# Patient Record
Sex: FEMALE | Race: WHITE | NOT HISPANIC OR LATINO | Employment: FULL TIME | URBAN - METROPOLITAN AREA
[De-identification: names, ages, dates, MRNs, and addresses within clinical notes are randomized per-mention and may not be internally consistent; named-entity substitution may affect disease eponyms.]

---
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
## 2021-08-27 ENCOUNTER — TELEPHONE (OUTPATIENT)
Dept: FAMILY MEDICINE CLINIC | Facility: CLINIC | Age: 21
End: 2021-08-27

## 2021-08-27 NOTE — TELEPHONE ENCOUNTER
Tried to call the pts mother back who called but she did not have a vm set up so I called pt and left message that she needs to call medicaid and get her provider changed before we can schedule. Her mother did call back shortly later and I told her the same thing.

## 2021-08-27 NOTE — TELEPHONE ENCOUNTER
[facility] staff attempted to follow warm transfer process and was unsuccessful     Caller: FUAD MEYERS    Relationship to patient: Mother    Best call back number: [phone]    Patient is needing: [facility] WAS ATTEMPTING TO TRANSFER THE PATIENT'S MOTHER WHEN THE CALL WAS DISCONNECTED. THE MOTHER CALLED TO RESCHEDULE HER DAUGHTER'S NEW PATIENT APPOINTMENT WITH A FEMALE PROVIDER IN THE OFFICE. SHE WILL NEED TO BE SEEN SOON, BUT THE HUB COULD NOT FIND AVAILABILITY DURING THE REQUESTED TIME FRAME.

## 2021-12-21 ENCOUNTER — OFFICE VISIT (OUTPATIENT)
Dept: FAMILY MEDICINE CLINIC | Facility: CLINIC | Age: 21
End: 2021-12-21

## 2021-12-21 ENCOUNTER — TELEPHONE (OUTPATIENT)
Dept: FAMILY MEDICINE CLINIC | Facility: CLINIC | Age: 21
End: 2021-12-21

## 2021-12-21 VITALS
OXYGEN SATURATION: 97 % | WEIGHT: 293 LBS | RESPIRATION RATE: 16 BRPM | DIASTOLIC BLOOD PRESSURE: 75 MMHG | HEIGHT: 68 IN | HEART RATE: 79 BPM | TEMPERATURE: 98 F | SYSTOLIC BLOOD PRESSURE: 116 MMHG | BODY MASS INDEX: 44.41 KG/M2

## 2021-12-21 DIAGNOSIS — Z78.9 FEMALE-TO-MALE TRANSGENDER PERSON: Primary | ICD-10-CM

## 2021-12-21 DIAGNOSIS — Z13.220 SCREENING CHOLESTEROL LEVEL: ICD-10-CM

## 2021-12-21 DIAGNOSIS — F43.10 PTSD (POST-TRAUMATIC STRESS DISORDER): ICD-10-CM

## 2021-12-21 DIAGNOSIS — M25.50 MULTIPLE JOINT PAIN: ICD-10-CM

## 2021-12-21 DIAGNOSIS — R07.89 CHEST TIGHTNESS: ICD-10-CM

## 2021-12-21 DIAGNOSIS — R41.3 MEMORY DEFICIT: ICD-10-CM

## 2021-12-21 DIAGNOSIS — R41.840 ATTENTION AND CONCENTRATION DEFICIT: ICD-10-CM

## 2021-12-21 DIAGNOSIS — F41.9 ANXIETY: ICD-10-CM

## 2021-12-21 DIAGNOSIS — Z13.1 SCREENING FOR DIABETES MELLITUS: ICD-10-CM

## 2021-12-21 DIAGNOSIS — N92.6 IRREGULAR PERIODS: ICD-10-CM

## 2021-12-21 PROCEDURE — 99214 OFFICE O/P EST MOD 30 MIN: CPT | Performed by: PHYSICIAN ASSISTANT

## 2021-12-21 RX ORDER — SUMATRIPTAN 25 MG/1
TABLET, FILM COATED ORAL
COMMUNITY
Start: 2016-09-28

## 2021-12-21 RX ORDER — ONDANSETRON 4 MG/1
TABLET, FILM COATED ORAL EVERY 6 HOURS
COMMUNITY
Start: 2019-01-22

## 2021-12-21 RX ORDER — CETIRIZINE HYDROCHLORIDE 10 MG/1
TABLET ORAL
COMMUNITY
Start: 2016-09-28

## 2021-12-21 RX ORDER — LORATADINE 10 MG/1
TABLET ORAL
COMMUNITY
Start: 2016-09-28

## 2021-12-21 RX ORDER — TOPIRAMATE 25 MG/1
TABLET ORAL
COMMUNITY
Start: 2016-09-28

## 2021-12-21 RX ORDER — FLUTICASONE PROPIONATE 50 MCG
SPRAY, SUSPENSION (ML) NASAL
COMMUNITY
Start: 2018-11-05 | End: 2021-12-21

## 2021-12-21 NOTE — TELEPHONE ENCOUNTER
Can you please check with [facility] and get any imaging results done on pt's left knee and left ankle in the past?

## 2021-12-21 NOTE — TELEPHONE ENCOUNTER
PATIENT CALLED AND STATES SHE WAS JUST SEEN AND FORGOT TO GET A LETTER OF ACCOMMODATION.   STATES THE LETTER NEEDS A START DATE, REASON, AND NEEDS TO SAY PERMANETLY FOR THE REASON OR IT WILL NOT BE ACCEPTED. SHE WILL BE BACK IN THE MORNING FOR LABS AND WILL  LETTER AT THE SAME TIME.    CALL BACK NUMBER [phone]

## 2021-12-21 NOTE — PROGRESS NOTES
Subjective   Chelsea Griffin is a 21 y.o. female.     Pt is a new patient who presents for routine annual physical.  He is concerned about possible asthma.  He does have family hx of asthma.  He sometimes gets tight feeling in chest and that sometimes lasts a minute or up to 10 minutes. He did try to use an albuterol inhaler which helped a little.  Not associated with anxiety or stress that he is aware.  Also feeling more forgetful and not sure if due to possible ADHD/autism.  He does see therapist once every 2 weeks and has been seeing them for over a year.  He does have hx of anxiety and PTSD. Her brother is high functioning autistic. Would be interested in getting tested for autism.  Has issues also getting to sleep and staying asleep also.    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  Would like accomodation for work so that he doesn't have to go up and down the stairs.  He also has overactive bladder and needs to be able to have frequent bathroom breaks as needed (bad day at least 1 time per hour).  He also would like to have headphone accomodation to be able to have one ear bud in to help him stay focused on work and not on him thoughts.    His mom also has Melanoma and is going through treatment which is adding to stress/anxiety.   Also had injury to left ankle years ago and it continues to give out on him. Xray was normal.  Also had injury to left knee years ago and landed on knee.  Xrays normal but continues to bother him.    Elbow and wrist on left bother him and other joints also.        The following portions of the patient's history were reviewed and updated as appropriate: allergies, current medications, past family history, past medical history, past social history, past surgical history and problem list.  Past Medical History:   Diagnosis Date   • Anxiety    • Heart murmur    • PTSD  (post-traumatic stress disorder)      Past Surgical History:   Procedure Laterality Date   • ADENOIDECTOMY     • TONSILLECTOMY       Family History   Problem Relation Age of Onset   • Anxiety disorder Mother    • Arthritis Mother    • Asthma Mother    • Cancer Mother    • Depression Mother    • Developmental Disability Mother    • Diabetes Mother    • Hearing loss Mother    • Hyperlipidemia Mother    • Hypertension Mother    • Mental illness Mother    • Stroke Mother    • COPD Father    • Hyperlipidemia Father    • Hypertension Father    • Depression Father    • Diabetes Maternal Grandmother    • Cancer Maternal Grandmother    • Hypertension Maternal Grandmother    • Hyperlipidemia Maternal Grandmother    • COPD Maternal Grandfather    • Asthma Maternal Grandfather    • Cancer Maternal Grandfather    • Diabetes Paternal Grandmother    • Cancer Paternal Grandmother    • Hypertension Paternal Grandmother    • Hyperlipidemia Paternal Grandmother    • COPD Paternal Grandfather    • Heart disease Paternal Grandfather      Social History     Socioeconomic History   • Marital status: Single   Tobacco Use   • Smoking status: Never Smoker   • Smokeless tobacco: Never Used   Vaping Use   • Vaping Use: Never used   Substance and Sexual Activity   • Drug use: Never   • Sexual activity: Defer         Current Outpatient Medications:   •  cetirizine (ZyrTEC Allergy) 10 MG tablet, ZYRTEC ALLERGY 10 MG TABS, Disp: , Rfl:   •  loratadine (Claritin) 10 MG tablet, CLARITIN 10 MG TABS, Disp: , Rfl:   •  ondansetron (Zofran) 4 MG tablet, Every 6 (Six) Hours., Disp: , Rfl:   •  SUMAtriptan (Imitrex) 25 MG tablet, IMITREX 25 MG TABS, Disp: , Rfl:   •  topiramate (Topamax) 25 MG tablet, TOPAMAX 25 MG TABS, Disp: , Rfl:     Review of Systems   Constitutional: Positive for fatigue. Negative for activity change, appetite change, chills, diaphoresis, fever, unexpected weight gain and unexpected weight loss.   HENT: Positive for tinnitus.   "  Eyes: Negative for visual disturbance.   Respiratory: Positive for chest tightness and shortness of breath. Negative for wheezing.    Cardiovascular: Negative for chest pain, palpitations and leg swelling.   Gastrointestinal: Negative for abdominal pain, anal bleeding, blood in stool, constipation, diarrhea and nausea.   Genitourinary: Positive for menstrual problem. Negative for pelvic pain, pelvic pressure and vaginal discharge.   Musculoskeletal: Positive for arthralgias. Negative for back pain and gait problem.   Neurological: Positive for memory problem. Negative for dizziness, tremors, seizures, syncope, weakness, light-headedness, headache and confusion.   Psychiatric/Behavioral: Positive for decreased concentration, sleep disturbance, depressed mood and stress. Negative for agitation, self-injury and suicidal ideas. The patient is nervous/anxious.      /75 (BP Location: Right arm, Patient Position: Sitting, Cuff Size: Large Adult)   Pulse 79   Temp 98 °F (36.7 °C) (Temporal)   Resp 16   Ht 172 cm (67.72\")   Wt (!) 139 kg (306 lb)   SpO2 97%   BMI 46.92 kg/m²       Objective   Physical Exam  Vitals and nursing note reviewed.   Constitutional:       Appearance: Normal appearance. She is obese.   HENT:      Head: Normocephalic and atraumatic.      Right Ear: Tympanic membrane, ear canal and external ear normal.      Left Ear: Tympanic membrane, ear canal and external ear normal.   Eyes:      Pupils: Pupils are equal, round, and reactive to light.   Neck:      Thyroid: No thyroid mass, thyromegaly or thyroid tenderness.      Vascular: No carotid bruit.   Cardiovascular:      Rate and Rhythm: Normal rate and regular rhythm.      Heart sounds: Normal heart sounds.   Pulmonary:      Effort: Pulmonary effort is normal.      Breath sounds: Normal breath sounds.   Musculoskeletal:      Cervical back: Normal range of motion and neck supple.      Right lower leg: No edema.      Left lower leg: No edema. "   Skin:     General: Skin is warm and dry.   Neurological:      General: No focal deficit present.      Mental Status: She is alert and oriented to person, place, and time.   Psychiatric:         Mood and Affect: Mood normal.         Behavior: Behavior normal.         Thought Content: Thought content normal.         Procedures     Assessment/Plan   Diagnoses and all orders for this visit:    1. 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  Orders:  -     Ambulatory Referral to Endocrinology    2. Chest tightness  Comments:  No current chest tightness but comes and goes with shortness of breath.  No associated anxiety and ocurrs at rest and with with activity. Will check PFTs since   Orders:  -     Full Pulmonary Function Test With Bronchodilator; Future    3. Multiple joint pain  Comments:  Will check labs and will get imaging results from left ankle and left knee done at [facility] in the past.    4. Anxiety  Comments:  Continue seeing therapist.  Will also send to neuropsych for further evaluation for possible ADHD and autism.  Orders:  -     Ambulatory Referral to Neuropsychology    5. PTSD (post-traumatic stress disorder)  Comments:  Continue therapy  Orders:  -     Ambulatory Referral to Neuropsychology    6. Attention and concentration deficit  Comments:  Likely ADHD or secondary to anxiety not being under control.   Orders:  -     Ambulatory Referral to Neuropsychology    7. Memory deficit  Comments:  Will send for neuropsych testing. Likely secondary to her anxiety and/or ADHD.  Orders:  -     Ambulatory Referral to Neuropsychology    8. Screening cholesterol level  Comments:  Pt to get fasting labs done and follow up in 2 weeks.  Orders:  -     Lipid Panel; Future    9. Screening for diabetes mellitus  Comments:  Pt to get fasting labs done and follow up in 2 weeks.  Orders:  -     Comprehensive Metabolic Panel; Future    10. Irregular periods  Comments:  Possibly  PCOS.  Will check labs and follow up in 2 weeks.  Orders:  -     FSH & LH; Future  -     CBC auto differential; Future  -     DHEA-sulfate; Future  -     Ferritin; Future  -     Follicle stimulating hormone; Future  -     Luteinizing hormone; Future  -     Prolactin; Future  -     Testosterone Free Direct; Future  -     TSH; Future  -     Insulin, Total; Future      I spent 30 minutes of patient care including reviewing pt's previous hx, reviewing current symptoms, performing exam, ordering tests, discussing treatment plan and completing my note.

## 2021-12-22 ENCOUNTER — LAB (OUTPATIENT)
Dept: FAMILY MEDICINE CLINIC | Facility: CLINIC | Age: 21
End: 2021-12-22

## 2021-12-22 DIAGNOSIS — N92.6 IRREGULAR PERIODS: ICD-10-CM

## 2021-12-22 DIAGNOSIS — Z13.220 SCREENING CHOLESTEROL LEVEL: ICD-10-CM

## 2021-12-22 DIAGNOSIS — Z13.1 SCREENING FOR DIABETES MELLITUS: ICD-10-CM

## 2021-12-22 LAB
ALBUMIN SERPL-MCNC: 4.3 G/DL (ref 3.5–5.2)
ALBUMIN/GLOB SERPL: 1.4 G/DL
ALP SERPL-CCNC: 74 U/L (ref 39–117)
ALT SERPL W P-5'-P-CCNC: 16 U/L (ref 1–33)
ANION GAP SERPL CALCULATED.3IONS-SCNC: 10 MMOL/L (ref 5–15)
AST SERPL-CCNC: 18 U/L (ref 1–32)
BASOPHILS # BLD AUTO: 0.04 10*3/MM3 (ref 0–0.2)
BASOPHILS NFR BLD AUTO: 0.5 % (ref 0–1.5)
BILIRUB SERPL-MCNC: 0.5 MG/DL (ref 0–1.2)
BUN SERPL-MCNC: 16 MG/DL (ref 6–20)
BUN/CREAT SERPL: 22.5 (ref 7–25)
CALCIUM SPEC-SCNC: 9.2 MG/DL (ref 8.6–10.5)
CHLORIDE SERPL-SCNC: 100 MMOL/L (ref 98–107)
CHOLEST SERPL-MCNC: 171 MG/DL (ref 0–200)
CO2 SERPL-SCNC: 27 MMOL/L (ref 22–29)
CREAT SERPL-MCNC: 0.71 MG/DL (ref 0.57–1)
DEPRECATED RDW RBC AUTO: 38.9 FL (ref 37–54)
EOSINOPHIL # BLD AUTO: 0.09 10*3/MM3 (ref 0–0.4)
EOSINOPHIL NFR BLD AUTO: 1.1 % (ref 0.3–6.2)
ERYTHROCYTE [DISTWIDTH] IN BLOOD BY AUTOMATED COUNT: 12.6 % (ref 12.3–15.4)
FERRITIN SERPL-MCNC: 80.9 NG/ML (ref 13–150)
FSH SERPL-ACNC: 3.23 MIU/ML
FSH SERPL-ACNC: 3.23 MIU/ML
GFR SERPL CREATININE-BSD FRML MDRD: 104 ML/MIN/1.73
GLOBULIN UR ELPH-MCNC: 3 GM/DL
GLUCOSE SERPL-MCNC: 94 MG/DL (ref 65–99)
HCT VFR BLD AUTO: 36.5 % (ref 34–46.6)
HDLC SERPL-MCNC: 38 MG/DL (ref 40–60)
HGB BLD-MCNC: 11.8 G/DL (ref 12–15.9)
IMM GRANULOCYTES # BLD AUTO: 0.02 10*3/MM3 (ref 0–0.05)
IMM GRANULOCYTES NFR BLD AUTO: 0.2 % (ref 0–0.5)
LDLC SERPL CALC-MCNC: 114 MG/DL (ref 0–100)
LDLC/HDLC SERPL: 2.96 {RATIO}
LH SERPL-ACNC: 2.45 MIU/ML
LH SERPL-ACNC: 2.45 MIU/ML
LYMPHOCYTES # BLD AUTO: 3.45 10*3/MM3 (ref 0.7–3.1)
LYMPHOCYTES NFR BLD AUTO: 40.5 % (ref 19.6–45.3)
MCH RBC QN AUTO: 27.4 PG (ref 26.6–33)
MCHC RBC AUTO-ENTMCNC: 32.3 G/DL (ref 31.5–35.7)
MCV RBC AUTO: 84.7 FL (ref 79–97)
MONOCYTES # BLD AUTO: 0.55 10*3/MM3 (ref 0.1–0.9)
MONOCYTES NFR BLD AUTO: 6.5 % (ref 5–12)
NEUTROPHILS NFR BLD AUTO: 4.36 10*3/MM3 (ref 1.7–7)
NEUTROPHILS NFR BLD AUTO: 51.2 % (ref 42.7–76)
NRBC BLD AUTO-RTO: 0 /100 WBC (ref 0–0.2)
PLATELET # BLD AUTO: 298 10*3/MM3 (ref 140–450)
PMV BLD AUTO: 11 FL (ref 6–12)
POTASSIUM SERPL-SCNC: 4.3 MMOL/L (ref 3.5–5.2)
PROLACTIN SERPL-MCNC: 7.08 NG/ML (ref 4.79–23.3)
PROT SERPL-MCNC: 7.3 G/DL (ref 6–8.5)
RBC # BLD AUTO: 4.31 10*6/MM3 (ref 3.77–5.28)
SODIUM SERPL-SCNC: 137 MMOL/L (ref 136–145)
TRIGL SERPL-MCNC: 102 MG/DL (ref 0–150)
TSH SERPL DL<=0.05 MIU/L-ACNC: 1.65 UIU/ML (ref 0.27–4.2)
VLDLC SERPL-MCNC: 19 MG/DL (ref 5–40)
WBC NRBC COR # BLD: 8.51 10*3/MM3 (ref 3.4–10.8)

## 2021-12-22 PROCEDURE — 84402 ASSAY OF FREE TESTOSTERONE: CPT | Performed by: PHYSICIAN ASSISTANT

## 2021-12-22 PROCEDURE — 82627 DEHYDROEPIANDROSTERONE: CPT | Performed by: PHYSICIAN ASSISTANT

## 2021-12-22 PROCEDURE — 80053 COMPREHEN METABOLIC PANEL: CPT | Performed by: PHYSICIAN ASSISTANT

## 2021-12-22 PROCEDURE — 36415 COLL VENOUS BLD VENIPUNCTURE: CPT

## 2021-12-22 PROCEDURE — 84146 ASSAY OF PROLACTIN: CPT | Performed by: PHYSICIAN ASSISTANT

## 2021-12-22 PROCEDURE — 82728 ASSAY OF FERRITIN: CPT | Performed by: PHYSICIAN ASSISTANT

## 2021-12-22 PROCEDURE — 84443 ASSAY THYROID STIM HORMONE: CPT | Performed by: PHYSICIAN ASSISTANT

## 2021-12-22 PROCEDURE — 83001 ASSAY OF GONADOTROPIN (FSH): CPT | Performed by: PHYSICIAN ASSISTANT

## 2021-12-22 PROCEDURE — 85025 COMPLETE CBC W/AUTO DIFF WBC: CPT | Performed by: PHYSICIAN ASSISTANT

## 2021-12-22 PROCEDURE — 83525 ASSAY OF INSULIN: CPT | Performed by: PHYSICIAN ASSISTANT

## 2021-12-22 PROCEDURE — 80061 LIPID PANEL: CPT | Performed by: PHYSICIAN ASSISTANT

## 2021-12-22 PROCEDURE — 83002 ASSAY OF GONADOTROPIN (LH): CPT | Performed by: PHYSICIAN ASSISTANT

## 2021-12-23 DIAGNOSIS — F41.9 ANXIETY: ICD-10-CM

## 2021-12-23 DIAGNOSIS — R41.840 ATTENTION AND CONCENTRATION DEFICIT: ICD-10-CM

## 2021-12-23 DIAGNOSIS — F43.10 PTSD (POST-TRAUMATIC STRESS DISORDER): Primary | ICD-10-CM

## 2021-12-23 LAB
DHEA-S SERPL-MCNC: 132 UG/DL (ref 110–431.7)
INSULIN SERPL-ACNC: 21.8 UIU/ML (ref 2.6–24.9)

## 2021-12-24 LAB — TESTOST FREE SERPL-MCNC: 1.4 PG/ML (ref 0–4.2)

## 2021-12-29 ENCOUNTER — TRANSCRIBE ORDERS (OUTPATIENT)
Dept: ADMINISTRATIVE | Facility: HOSPITAL | Age: 21
End: 2021-12-29

## 2021-12-29 DIAGNOSIS — Z01.818 OTHER SPECIFIED PRE-OPERATIVE EXAMINATION: Primary | ICD-10-CM

## 2022-01-18 ENCOUNTER — OFFICE VISIT (OUTPATIENT)
Dept: FAMILY MEDICINE CLINIC | Facility: CLINIC | Age: 22
End: 2022-01-18

## 2022-01-18 VITALS
SYSTOLIC BLOOD PRESSURE: 122 MMHG | WEIGHT: 293 LBS | TEMPERATURE: 96.6 F | OXYGEN SATURATION: 100 % | DIASTOLIC BLOOD PRESSURE: 76 MMHG | BODY MASS INDEX: 46.15 KG/M2 | HEART RATE: 62 BPM

## 2022-01-18 DIAGNOSIS — F41.9 ANXIETY: ICD-10-CM

## 2022-01-18 DIAGNOSIS — Z78.9 FEMALE-TO-MALE TRANSGENDER PERSON: ICD-10-CM

## 2022-01-18 DIAGNOSIS — G89.29 CHRONIC PAIN OF LEFT KNEE: ICD-10-CM

## 2022-01-18 DIAGNOSIS — M25.562 CHRONIC PAIN OF LEFT KNEE: ICD-10-CM

## 2022-01-18 DIAGNOSIS — E78.00 HYPERCHOLESTEROLEMIA: Primary | ICD-10-CM

## 2022-01-18 DIAGNOSIS — R07.89 CHEST TIGHTNESS: ICD-10-CM

## 2022-01-18 PROCEDURE — 99213 OFFICE O/P EST LOW 20 MIN: CPT | Performed by: PHYSICIAN ASSISTANT

## 2022-01-18 NOTE — PROGRESS NOTES
Subjective   Chelsea Griffin is a 21 y.o. female.     Pt presents to follow up on labs. Labs were normal other than mildly elevated cholesterol. He left knee pain is not bothering him currently but has in the past.  Has bothered him intermittently since last February when he feel on his knee.  He gets intermittent pain in knee cap.  Doesn't want to do PT at this time. He is doing well with therapy.  He hasn't heard about referral for transgender specialist so information given today and pt encouraged to contact them.  Pt was also given [facility]'s contact information since he hasn't heard from them.  He states he is scheduled to have breathing tests soon.        The following portions of the patient's history were reviewed and updated as appropriate: allergies, current medications, past family history, past medical history, past social history, past surgical history and problem list.  Past Medical History:   Diagnosis Date   • Anxiety    • Heart murmur    • PTSD (post-traumatic stress disorder)      Past Surgical History:   Procedure Laterality Date   • ADENOIDECTOMY     • TONSILLECTOMY       Family History   Problem Relation Age of Onset   • Anxiety disorder Mother    • Arthritis Mother    • Asthma Mother    • Cancer Mother    • Depression Mother    • Developmental Disability Mother    • Diabetes Mother    • Hearing loss Mother    • Hyperlipidemia Mother    • Hypertension Mother    • Mental illness Mother    • Stroke Mother    • COPD Father    • Hyperlipidemia Father    • Hypertension Father    • Depression Father    • Diabetes Maternal Grandmother    • Cancer Maternal Grandmother    • Hypertension Maternal Grandmother    • Hyperlipidemia Maternal Grandmother    • COPD Maternal Grandfather    • Asthma Maternal Grandfather    • Cancer Maternal Grandfather    • Diabetes Paternal Grandmother    • Cancer Paternal Grandmother    • Hypertension Paternal Grandmother    • Hyperlipidemia Paternal Grandmother    • COPD  Paternal Grandfather    • Heart disease Paternal Grandfather      Social History     Socioeconomic History   • Marital status: Single   Tobacco Use   • Smoking status: Never Smoker   • Smokeless tobacco: Never Used   Vaping Use   • Vaping Use: Never used   Substance and Sexual Activity   • Drug use: Never   • Sexual activity: Defer         Current Outpatient Medications:   •  cetirizine (ZyrTEC Allergy) 10 MG tablet, ZYRTEC ALLERGY 10 MG TABS, Disp: , Rfl:   •  loratadine (Claritin) 10 MG tablet, CLARITIN 10 MG TABS, Disp: , Rfl:   •  ondansetron (Zofran) 4 MG tablet, Every 6 (Six) Hours., Disp: , Rfl:   •  SUMAtriptan (Imitrex) 25 MG tablet, IMITREX 25 MG TABS, Disp: , Rfl:   •  topiramate (Topamax) 25 MG tablet, TOPAMAX 25 MG TABS, Disp: , Rfl:     Review of Systems   Constitutional: Negative for activity change, appetite change, chills, diaphoresis, fatigue, fever, unexpected weight gain and unexpected weight loss.   Respiratory: Negative for chest tightness and shortness of breath.    Cardiovascular: Negative for chest pain, palpitations and leg swelling.   Musculoskeletal: Positive for arthralgias.   Neurological: Negative for dizziness, tremors, weakness, light-headedness, headache and confusion.   Psychiatric/Behavioral: Positive for stress. The patient is nervous/anxious.      /76 (BP Location: Right arm, Patient Position: Sitting, Cuff Size: Large Adult)   Pulse 62   Temp 96.6 °F (35.9 °C) (Tympanic)   Wt (!) 137 kg (301 lb)   SpO2 100%   BMI 46.15 kg/m²       Objective   Physical Exam  Vitals and nursing note reviewed.   Constitutional:       Appearance: Normal appearance.   Neck:      Vascular: No carotid bruit.   Cardiovascular:      Rate and Rhythm: Normal rate and regular rhythm.      Heart sounds: Normal heart sounds.   Pulmonary:      Effort: Pulmonary effort is normal.      Breath sounds: Normal breath sounds.   Musculoskeletal:      Cervical back: Neck supple.   Neurological:       General: No focal deficit present.      Mental Status: She is alert and oriented to person, place, and time.   Psychiatric:         Mood and Affect: Mood normal.         Behavior: Behavior normal.         Thought Content: Thought content normal.         Judgment: Judgment normal.         Procedures     Assessment/Plan   Diagnoses and all orders for this visit:    1. Hypercholesterolemia (Primary)  Comments:  Discussed low saturated fat diet and high fiber diet.  Also discussed exercising at least 20 minutes per day.  Recheck annually.    2. Female-to-male transgender person  Comments:  Contact information for referral given to patient today at visit.    3. Chest tightness  Comments:  Pt to get pulmonary function tests done as scheduled.  Work on weight loss since this may be contributing.    4. Anxiety  Comments:  Doing well with therapy.  Also given contact information for [facility] today for further eval.    5. Chronic pain of left knee  Comments:  Pt states it is intermittent and manageable at this time.  If it worsens and he decides he wants to do PT, he will let me know.

## 2022-01-19 ENCOUNTER — APPOINTMENT (OUTPATIENT)
Dept: LAB | Facility: HOSPITAL | Age: 22
End: 2022-01-19

## 2022-01-21 ENCOUNTER — APPOINTMENT (OUTPATIENT)
Dept: RESPIRATORY THERAPY | Facility: HOSPITAL | Age: 22
End: 2022-01-21

## 2023-04-17 ENCOUNTER — APPOINTMENT (OUTPATIENT)
Dept: GENERAL RADIOLOGY | Facility: HOSPITAL | Age: 23
End: 2023-04-17
Payer: COMMERCIAL

## 2023-04-17 ENCOUNTER — HOSPITAL ENCOUNTER (EMERGENCY)
Facility: HOSPITAL | Age: 23
Discharge: HOME | End: 2023-04-17
Attending: EMERGENCY MEDICINE | Admitting: EMERGENCY MEDICINE
Payer: COMMERCIAL

## 2023-04-17 VITALS
DIASTOLIC BLOOD PRESSURE: 73 MMHG | HEART RATE: 80 BPM | HEIGHT: 69 IN | SYSTOLIC BLOOD PRESSURE: 128 MMHG | BODY MASS INDEX: 43.4 KG/M2 | WEIGHT: 293 LBS | OXYGEN SATURATION: 98 % | TEMPERATURE: 98 F | RESPIRATION RATE: 19 BRPM

## 2023-04-17 DIAGNOSIS — R53.1 WEAKNESS: Primary | ICD-10-CM

## 2023-04-17 LAB
ALBUMIN SERPL-MCNC: 4.3 G/DL (ref 3.5–5.2)
ALBUMIN/GLOB SERPL: 1.4 G/DL
ALP SERPL-CCNC: 85 U/L (ref 39–117)
ALT SERPL W P-5'-P-CCNC: 16 U/L (ref 1–33)
ANION GAP SERPL CALCULATED.3IONS-SCNC: 10 MMOL/L (ref 5–15)
AST SERPL-CCNC: 17 U/L (ref 1–32)
BASOPHILS # BLD AUTO: 0 10*3/MM3 (ref 0–0.2)
BASOPHILS NFR BLD AUTO: 0.4 % (ref 0–1.5)
BILIRUB SERPL-MCNC: 0.7 MG/DL (ref 0–1.2)
BILIRUB UR QL STRIP: NEGATIVE
BUN SERPL-MCNC: 12 MG/DL (ref 6–20)
BUN/CREAT SERPL: 18.5 (ref 7–25)
CALCIUM SPEC-SCNC: 8.9 MG/DL (ref 8.6–10.5)
CHLORIDE SERPL-SCNC: 100 MMOL/L (ref 98–107)
CK SERPL-CCNC: 129 U/L (ref 20–180)
CLARITY UR: CLEAR
CO2 SERPL-SCNC: 28 MMOL/L (ref 22–29)
COHGB MFR BLD: 2.1 % (ref 0–3)
COLOR UR: YELLOW
CREAT SERPL-MCNC: 0.65 MG/DL (ref 0.57–1)
DEPRECATED RDW RBC AUTO: 42.4 FL (ref 37–54)
EGFRCR SERPLBLD CKD-EPI 2021: 127.8 ML/MIN/1.73
EOSINOPHIL # BLD AUTO: 0 10*3/MM3 (ref 0–0.4)
EOSINOPHIL NFR BLD AUTO: 0.5 % (ref 0.3–6.2)
ERYTHROCYTE [DISTWIDTH] IN BLOOD BY AUTOMATED COUNT: 13.9 % (ref 12.3–15.4)
FLUAV SUBTYP SPEC NAA+PROBE: NOT DETECTED
FLUBV RNA ISLT QL NAA+PROBE: NOT DETECTED
GLOBULIN UR ELPH-MCNC: 3 GM/DL
GLUCOSE SERPL-MCNC: 93 MG/DL (ref 65–99)
GLUCOSE UR STRIP-MCNC: NEGATIVE MG/DL
HCT VFR BLD AUTO: 40.5 % (ref 34–46.6)
HGB BLD-MCNC: 13.8 G/DL (ref 12–15.9)
HGB UR QL STRIP.AUTO: NEGATIVE
KETONES UR QL STRIP: NEGATIVE
LEUKOCYTE ESTERASE UR QL STRIP.AUTO: NEGATIVE
LIPASE SERPL-CCNC: 31 U/L (ref 13–60)
LYMPHOCYTES # BLD AUTO: 0.9 10*3/MM3 (ref 0.7–3.1)
LYMPHOCYTES NFR BLD AUTO: 12.8 % (ref 19.6–45.3)
MAGNESIUM SERPL-MCNC: 1.8 MG/DL (ref 1.6–2.6)
MCH RBC QN AUTO: 28 PG (ref 26.6–33)
MCHC RBC AUTO-ENTMCNC: 34 G/DL (ref 31.5–35.7)
MCV RBC AUTO: 82.5 FL (ref 79–97)
MONOCYTES # BLD AUTO: 0.4 10*3/MM3 (ref 0.1–0.9)
MONOCYTES NFR BLD AUTO: 5.5 % (ref 5–12)
NEUTROPHILS NFR BLD AUTO: 5.6 10*3/MM3 (ref 1.7–7)
NEUTROPHILS NFR BLD AUTO: 80.8 % (ref 42.7–76)
NITRITE UR QL STRIP: NEGATIVE
NRBC BLD AUTO-RTO: 0 /100 WBC (ref 0–0.2)
PH UR STRIP.AUTO: 7.5 [PH] (ref 5–8)
PLATELET # BLD AUTO: 249 10*3/MM3 (ref 140–450)
PMV BLD AUTO: 8.5 FL (ref 6–12)
POTASSIUM SERPL-SCNC: 4.6 MMOL/L (ref 3.5–5.2)
PROT SERPL-MCNC: 7.3 G/DL (ref 6–8.5)
PROT UR QL STRIP: NEGATIVE
RBC # BLD AUTO: 4.91 10*6/MM3 (ref 3.77–5.28)
SARS-COV-2 RNA RESP QL NAA+PROBE: NOT DETECTED
SODIUM SERPL-SCNC: 138 MMOL/L (ref 136–145)
SP GR UR STRIP: 1.01 (ref 1–1.03)
TSH SERPL DL<=0.05 MIU/L-ACNC: 0.78 UIU/ML (ref 0.27–4.2)
UROBILINOGEN UR QL STRIP: NORMAL
WBC NRBC COR # BLD: 7 10*3/MM3 (ref 3.4–10.8)

## 2023-04-17 PROCEDURE — 80050 GENERAL HEALTH PANEL: CPT

## 2023-04-17 PROCEDURE — 71046 X-RAY EXAM CHEST 2 VIEWS: CPT

## 2023-04-17 PROCEDURE — 83735 ASSAY OF MAGNESIUM: CPT | Performed by: EMERGENCY MEDICINE

## 2023-04-17 PROCEDURE — 87636 SARSCOV2 & INF A&B AMP PRB: CPT | Performed by: EMERGENCY MEDICINE

## 2023-04-17 PROCEDURE — 96374 THER/PROPH/DIAG INJ IV PUSH: CPT

## 2023-04-17 PROCEDURE — 99283 EMERGENCY DEPT VISIT LOW MDM: CPT

## 2023-04-17 PROCEDURE — 81003 URINALYSIS AUTO W/O SCOPE: CPT

## 2023-04-17 PROCEDURE — 82375 ASSAY CARBOXYHB QUANT: CPT | Performed by: EMERGENCY MEDICINE

## 2023-04-17 PROCEDURE — 83690 ASSAY OF LIPASE: CPT

## 2023-04-17 PROCEDURE — 82550 ASSAY OF CK (CPK): CPT | Performed by: EMERGENCY MEDICINE

## 2023-04-17 PROCEDURE — 25010000002 ONDANSETRON PER 1 MG: Performed by: EMERGENCY MEDICINE

## 2023-04-17 RX ORDER — ONDANSETRON 2 MG/ML
4 INJECTION INTRAMUSCULAR; INTRAVENOUS ONCE
Status: COMPLETED | OUTPATIENT
Start: 2023-04-17 | End: 2023-04-17

## 2023-04-17 RX ORDER — ONDANSETRON 4 MG/1
4 TABLET, ORALLY DISINTEGRATING ORAL EVERY 6 HOURS PRN
Qty: 12 TABLET | Refills: 0 | Status: SHIPPED | OUTPATIENT
Start: 2023-04-17

## 2023-04-17 RX ORDER — SODIUM CHLORIDE 0.9 % (FLUSH) 0.9 %
10 SYRINGE (ML) INJECTION AS NEEDED
Status: DISCONTINUED | OUTPATIENT
Start: 2023-04-17 | End: 2023-04-18 | Stop reason: HOSPADM

## 2023-04-17 RX ADMIN — SODIUM CHLORIDE, POTASSIUM CHLORIDE, SODIUM LACTATE AND CALCIUM CHLORIDE 1000 ML: 600; 310; 30; 20 INJECTION, SOLUTION INTRAVENOUS at 21:18

## 2023-04-17 RX ADMIN — ONDANSETRON 4 MG: 2 INJECTION INTRAMUSCULAR; INTRAVENOUS at 21:18

## 2023-04-17 NOTE — ED PROVIDER NOTES
Subjective      Provider in Triage Note  Patient presented to the emergency room with headache, nausea vomiting mid abdominal pain.  Denies diarrhea or constipation has had no recent illness or exposure.      History of Present Illness  Chief complaint General weakness headache nausea vomiting fatigue    History of present illness 22-year-old female with 3-day history of some general fatigue weakness body aches little bit of cough congestion headache some vomiting no diarrhea.  No fever chills.  No foreign travels antibiotic use or recent hospitalization no dysuria frequency no tick bites or rashes no one in the home with similar illness.  No rashes.  No discharge or worry of STD.  Denies any injury or foreign travels.  No other complaints or associated symptoms at this time.        Review of Systems   Constitutional: Positive for fatigue. Negative for chills and fever.   HENT: Positive for congestion. Negative for sinus pressure.    Eyes: Negative for photophobia and visual disturbance.   Respiratory: Negative for cough, chest tightness and shortness of breath.    Gastrointestinal: Positive for abdominal pain and vomiting.   Genitourinary: Negative for difficulty urinating and dysuria.   Musculoskeletal: Positive for myalgias. Negative for back pain and neck pain.   Skin: Negative for rash and wound.   Neurological: Positive for headaches. Negative for dizziness, facial asymmetry and speech difficulty.   Psychiatric/Behavioral: Negative for agitation and confusion.       Past Medical History:   Diagnosis Date   • Anxiety    • Heart murmur    • PTSD (post-traumatic stress disorder)        Allergies   Allergen Reactions   • Promethazine Unknown - High Severity       Past Surgical History:   Procedure Laterality Date   • ADENOIDECTOMY     • TONSILLECTOMY         Family History   Problem Relation Age of Onset   • Anxiety disorder Mother    • Arthritis Mother    • Asthma Mother    • Cancer Mother    • Depression Mother     • Developmental Disability Mother    • Diabetes Mother    • Hearing loss Mother    • Hyperlipidemia Mother    • Hypertension Mother    • Mental illness Mother    • Stroke Mother    • COPD Father    • Hyperlipidemia Father    • Hypertension Father    • Depression Father    • Diabetes Maternal Grandmother    • Cancer Maternal Grandmother    • Hypertension Maternal Grandmother    • Hyperlipidemia Maternal Grandmother    • COPD Maternal Grandfather    • Asthma Maternal Grandfather    • Cancer Maternal Grandfather    • Diabetes Paternal Grandmother    • Cancer Paternal Grandmother    • Hypertension Paternal Grandmother    • Hyperlipidemia Paternal Grandmother    • COPD Paternal Grandfather    • Heart disease Paternal Grandfather        Social History     Socioeconomic History   • Marital status: Single   Tobacco Use   • Smoking status: Never   • Smokeless tobacco: Never   Vaping Use   • Vaping Use: Never used   Substance and Sexual Activity   • Drug use: Never   • Sexual activity: Defer     Prior to Admission medications    Medication Sig Start Date End Date Taking? Authorizing Provider   cetirizine (ZyrTEC Allergy) 10 MG tablet ZYRTEC ALLERGY 10 MG TABS 9/28/16   ProviderKaylee MD   loratadine (Claritin) 10 MG tablet CLARITIN 10 MG TABS 9/28/16   ProviderKaylee MD   ondansetron ODT (ZOFRAN-ODT) 4 MG disintegrating tablet Place 1 tablet on the tongue Every 6 (Six) Hours As Needed for Nausea or Vomiting. 4/17/23   Zaid Weaver MD   SUMAtriptan (Imitrex) 25 MG tablet IMITREX 25 MG TABS 9/28/16   ProviderKaylee MD   topiramate (Topamax) 25 MG tablet TOPAMAX 25 MG TABS 9/28/16   Provider, MD Kaylee           Objective   Physical Exam  Constitutional is a 22-year-old female awake alert no distress triage vital signs reviewed.  HEENT extraocular muscles are intact pupils equal round react there is no photophobia mouth is clear.  No exudate.  Neck supple no adenopathy no meningeal signs no JVD.   Lungs clear no retractions back no CVA tenderness heart regular without murmur abdomen soft nontender nondistended good bowel sounds no peritoneal findings or pulsatile mass extremities no edema cords or Homans' sign or evidence of DVT.  Skin warm and dry without rashes or cellulitic changes.  Neurologic awake alert follows commands orientated x3 no facial asymmetry normal speech no focal weakness moves everything without difficulty  Procedures           ED Course      Results for orders placed or performed during the hospital encounter of 04/17/23   COVID-19 and FLU A/B PCR - Swab, Nasopharynx    Specimen: Nasopharynx; Swab   Result Value Ref Range    COVID19 Not Detected Not Detected - Ref. Range    Influenza A PCR Not Detected Not Detected    Influenza B PCR Not Detected Not Detected   Comprehensive Metabolic Panel    Specimen: Blood   Result Value Ref Range    Glucose 93 65 - 99 mg/dL    BUN 12 6 - 20 mg/dL    Creatinine 0.65 0.57 - 1.00 mg/dL    Sodium 138 136 - 145 mmol/L    Potassium 4.6 3.5 - 5.2 mmol/L    Chloride 100 98 - 107 mmol/L    CO2 28.0 22.0 - 29.0 mmol/L    Calcium 8.9 8.6 - 10.5 mg/dL    Total Protein 7.3 6.0 - 8.5 g/dL    Albumin 4.3 3.5 - 5.2 g/dL    ALT (SGPT) 16 1 - 33 U/L    AST (SGOT) 17 1 - 32 U/L    Alkaline Phosphatase 85 39 - 117 U/L    Total Bilirubin 0.7 0.0 - 1.2 mg/dL    Globulin 3.0 gm/dL    A/G Ratio 1.4 g/dL    BUN/Creatinine Ratio 18.5 7.0 - 25.0    Anion Gap 10.0 5.0 - 15.0 mmol/L    eGFR 127.8 >60.0 mL/min/1.73   Lipase    Specimen: Blood   Result Value Ref Range    Lipase 31 13 - 60 U/L   Urinalysis With Microscopic If Indicated (No Culture) - Urine, Clean Catch    Specimen: Urine, Clean Catch   Result Value Ref Range    Color, UA Yellow Yellow, Straw    Appearance, UA Clear Clear    pH, UA 7.5 5.0 - 8.0    Specific Gravity, UA 1.015 1.005 - 1.030    Glucose, UA Negative Negative    Ketones, UA Negative Negative    Bilirubin, UA Negative Negative    Blood, UA Negative  Negative    Protein, UA Negative Negative    Leuk Esterase, UA Negative Negative    Nitrite, UA Negative Negative    Urobilinogen, UA 0.2 E.U./dL 0.2 - 1.0 E.U./dL   CBC Auto Differential    Specimen: Blood   Result Value Ref Range    WBC 7.00 3.40 - 10.80 10*3/mm3    RBC 4.91 3.77 - 5.28 10*6/mm3    Hemoglobin 13.8 12.0 - 15.9 g/dL    Hematocrit 40.5 34.0 - 46.6 %    MCV 82.5 79.0 - 97.0 fL    MCH 28.0 26.6 - 33.0 pg    MCHC 34.0 31.5 - 35.7 g/dL    RDW 13.9 12.3 - 15.4 %    RDW-SD 42.4 37.0 - 54.0 fl    MPV 8.5 6.0 - 12.0 fL    Platelets 249 140 - 450 10*3/mm3    Neutrophil % 80.8 (H) 42.7 - 76.0 %    Lymphocyte % 12.8 (L) 19.6 - 45.3 %    Monocyte % 5.5 5.0 - 12.0 %    Eosinophil % 0.5 0.3 - 6.2 %    Basophil % 0.4 0.0 - 1.5 %    Neutrophils, Absolute 5.60 1.70 - 7.00 10*3/mm3    Lymphocytes, Absolute 0.90 0.70 - 3.10 10*3/mm3    Monocytes, Absolute 0.40 0.10 - 0.90 10*3/mm3    Eosinophils, Absolute 0.00 0.00 - 0.40 10*3/mm3    Basophils, Absolute 0.00 0.00 - 0.20 10*3/mm3    nRBC 0.0 0.0 - 0.2 /100 WBC   Carbon Monoxide, Blood    Specimen: Blood   Result Value Ref Range    Carbon Monoxide, Blood 2.1 0 - 3 %   TSH    Specimen: Blood   Result Value Ref Range    TSH 0.780 0.270 - 4.200 uIU/mL   CK    Specimen: Blood   Result Value Ref Range    Creatine Kinase 129 20 - 180 U/L   Magnesium    Specimen: Blood   Result Value Ref Range    Magnesium 1.8 1.6 - 2.6 mg/dL     XR Chest 2 View    Result Date: 4/17/2023  No radiographic findings of acute cardiopulmonary abnormality. Electronically Signed: Aki Cunningham  4/17/2023 9:23 PM EDT  Workstation ID: [id number]    Medications   lactated ringers bolus 1,000 mL (0 mL Intravenous Stopped 4/17/23 2312)   ondansetron (ZOFRAN) injection 4 mg (4 mg Intravenous Given 4/17/23 2118)                                            Medical Decision Making  Medical decision making IV established given a liter lactated Ringer's for Zofran IV.  Patient had the above exam evaluation.  Labs  independent reviewed by me COVID-19 flu negative comprehensive metabolic profile negative liver lipase normal urine normal CBC normal carbon oxide normal TSH normal CK magnesium all normal.  Chest x-ray obtained independent reviewed by me there is no evidence of pneumonia or pneumothorax or acute findings radiology review unremarkable patient repeat exam was resting, she had no vomiting on repeat exam her abdomen soft nontender good bowel sounds I do not see evidence of pneumonia on CNS meningitis or sepsis or encephalitis no evidence of suggest tickborne illness no evidence of COVID-19 or pneumonia or any evidence of urinary tract infection or acute intra-abdominal process based on my history physical and clinical findings.  This does not rule out all causes of just generalized weakness headaches and fatigue.  Did not see need for CT of the head and pelvis today as her exam is unremarkable with no focal findings.  If she becomes worse this can be done later.  We talked about the findings with the return for and she was subsequently discharged home for outpatient management.  Given Zofran at home.    Weakness: acute illness or injury  Amount and/or Complexity of Data Reviewed  Labs: ordered. Decision-making details documented in ED Course.  Radiology: ordered and independent interpretation performed. Decision-making details documented in ED Course.      Risk  Prescription drug management.          Final diagnoses:   Weakness       ED Disposition  ED Disposition     ED Disposition   Discharge    Condition   Stable    Comment   --             Kassandra Arreaga PA  [address]  [place] [facility]  [phone]    In 1 day           Medication List      New Prescriptions    ondansetron ODT 4 MG disintegrating tablet  Commonly known as: ZOFRAN-ODT  Place 1 tablet on the tongue Every 6 (Six) Hours As Needed for Nausea or Vomiting.        Stop    Zofran 4 MG tablet  Generic drug: ondansetron           Where  to Get Your Medications      These medications were sent to [facility] - [place], [place] - [address] - [phone]  - [phone] FX  [address] [place] [address]    Phone: [phone]   · ondansetron ODT 4 MG disintegrating tablet          Zaid Weaver MD  04/18/23 3582

## 2023-04-17 NOTE — Clinical Note
[facility] EMERGENCY DEPARTMENT  [address] [place] [address]  Phone: [phone]    Chelsea Griffin was seen and treated in our emergency department on 4/17/2023.  She may return to work on 04/18/2023.         Thank you for choosing [facility].    Zaid Weaver MD

## 2023-05-15 ENCOUNTER — HOSPITAL ENCOUNTER (EMERGENCY)
Facility: HOSPITAL | Age: 23
Discharge: HOME | End: 2023-05-15
Attending: EMERGENCY MEDICINE | Admitting: EMERGENCY MEDICINE
Payer: COMMERCIAL

## 2023-05-15 ENCOUNTER — APPOINTMENT (OUTPATIENT)
Dept: ULTRASOUND IMAGING | Facility: HOSPITAL | Age: 23
End: 2023-05-15
Payer: COMMERCIAL

## 2023-05-15 VITALS
HEIGHT: 69 IN | HEART RATE: 88 BPM | RESPIRATION RATE: 18 BRPM | DIASTOLIC BLOOD PRESSURE: 60 MMHG | OXYGEN SATURATION: 98 % | BODY MASS INDEX: 42.38 KG/M2 | TEMPERATURE: 97.2 F | SYSTOLIC BLOOD PRESSURE: 124 MMHG | WEIGHT: 286.16 LBS

## 2023-05-15 DIAGNOSIS — R11.10 VOMITING AND DIARRHEA: Primary | ICD-10-CM

## 2023-05-15 DIAGNOSIS — R19.7 VOMITING AND DIARRHEA: Primary | ICD-10-CM

## 2023-05-15 DIAGNOSIS — R10.13 EPIGASTRIC PAIN: ICD-10-CM

## 2023-05-15 LAB
ALBUMIN SERPL-MCNC: 4.4 G/DL (ref 3.5–5.2)
ALBUMIN/GLOB SERPL: 1.5 G/DL
ALP SERPL-CCNC: 66 U/L (ref 39–117)
ALT SERPL W P-5'-P-CCNC: 12 U/L (ref 1–33)
ANION GAP SERPL CALCULATED.3IONS-SCNC: 14 MMOL/L (ref 5–15)
AST SERPL-CCNC: 15 U/L (ref 1–32)
B-HCG UR QL: NEGATIVE
BASOPHILS # BLD AUTO: 0 10*3/MM3 (ref 0–0.2)
BASOPHILS NFR BLD AUTO: 0.3 % (ref 0–1.5)
BILIRUB SERPL-MCNC: 0.8 MG/DL (ref 0–1.2)
BILIRUB UR QL STRIP: NEGATIVE
BUN SERPL-MCNC: 17 MG/DL (ref 6–20)
BUN/CREAT SERPL: 25 (ref 7–25)
CALCIUM SPEC-SCNC: 8.8 MG/DL (ref 8.6–10.5)
CHLORIDE SERPL-SCNC: 100 MMOL/L (ref 98–107)
CLARITY UR: CLEAR
CO2 SERPL-SCNC: 24 MMOL/L (ref 22–29)
COLOR UR: ABNORMAL
CREAT SERPL-MCNC: 0.68 MG/DL (ref 0.57–1)
DEPRECATED RDW RBC AUTO: 42 FL (ref 37–54)
EGFRCR SERPLBLD CKD-EPI 2021: 126.5 ML/MIN/1.73
EOSINOPHIL # BLD AUTO: 0 10*3/MM3 (ref 0–0.4)
EOSINOPHIL NFR BLD AUTO: 0.1 % (ref 0.3–6.2)
ERYTHROCYTE [DISTWIDTH] IN BLOOD BY AUTOMATED COUNT: 14.1 % (ref 12.3–15.4)
GLOBULIN UR ELPH-MCNC: 3 GM/DL
GLUCOSE SERPL-MCNC: 108 MG/DL (ref 65–99)
GLUCOSE UR STRIP-MCNC: NEGATIVE MG/DL
HCT VFR BLD AUTO: 40.5 % (ref 34–46.6)
HGB BLD-MCNC: 13.7 G/DL (ref 12–15.9)
HGB UR QL STRIP.AUTO: NEGATIVE
KETONES UR QL STRIP: NEGATIVE
LEUKOCYTE ESTERASE UR QL STRIP.AUTO: NEGATIVE
LIPASE SERPL-CCNC: 23 U/L (ref 13–60)
LYMPHOCYTES # BLD AUTO: 0.6 10*3/MM3 (ref 0.7–3.1)
LYMPHOCYTES NFR BLD AUTO: 7.5 % (ref 19.6–45.3)
MCH RBC QN AUTO: 28.7 PG (ref 26.6–33)
MCHC RBC AUTO-ENTMCNC: 33.8 G/DL (ref 31.5–35.7)
MCV RBC AUTO: 84.8 FL (ref 79–97)
MONOCYTES # BLD AUTO: 0.3 10*3/MM3 (ref 0.1–0.9)
MONOCYTES NFR BLD AUTO: 3.8 % (ref 5–12)
NEUTROPHILS NFR BLD AUTO: 7.1 10*3/MM3 (ref 1.7–7)
NEUTROPHILS NFR BLD AUTO: 88.3 % (ref 42.7–76)
NITRITE UR QL STRIP: NEGATIVE
NRBC BLD AUTO-RTO: 0.1 /100 WBC (ref 0–0.2)
PH UR STRIP.AUTO: <=5 [PH] (ref 5–8)
PLATELET # BLD AUTO: 274 10*3/MM3 (ref 140–450)
PMV BLD AUTO: 8.5 FL (ref 6–12)
POTASSIUM SERPL-SCNC: 4 MMOL/L (ref 3.5–5.2)
PROT SERPL-MCNC: 7.4 G/DL (ref 6–8.5)
PROT UR QL STRIP: NEGATIVE
RBC # BLD AUTO: 4.78 10*6/MM3 (ref 3.77–5.28)
SODIUM SERPL-SCNC: 138 MMOL/L (ref 136–145)
SP GR UR STRIP: 1.03 (ref 1–1.03)
UROBILINOGEN UR QL STRIP: ABNORMAL
WBC NRBC COR # BLD: 8.1 10*3/MM3 (ref 3.4–10.8)

## 2023-05-15 PROCEDURE — 96374 THER/PROPH/DIAG INJ IV PUSH: CPT

## 2023-05-15 PROCEDURE — 25010000002 METOCLOPRAMIDE PER 10 MG: Performed by: EMERGENCY MEDICINE

## 2023-05-15 PROCEDURE — 85025 COMPLETE CBC W/AUTO DIFF WBC: CPT | Performed by: EMERGENCY MEDICINE

## 2023-05-15 PROCEDURE — 81025 URINE PREGNANCY TEST: CPT | Performed by: EMERGENCY MEDICINE

## 2023-05-15 PROCEDURE — 83690 ASSAY OF LIPASE: CPT | Performed by: EMERGENCY MEDICINE

## 2023-05-15 PROCEDURE — 99283 EMERGENCY DEPT VISIT LOW MDM: CPT

## 2023-05-15 PROCEDURE — 81003 URINALYSIS AUTO W/O SCOPE: CPT | Performed by: EMERGENCY MEDICINE

## 2023-05-15 PROCEDURE — 76705 ECHO EXAM OF ABDOMEN: CPT

## 2023-05-15 PROCEDURE — 80053 COMPREHEN METABOLIC PANEL: CPT | Performed by: EMERGENCY MEDICINE

## 2023-05-15 RX ORDER — METOCLOPRAMIDE 10 MG/1
10 TABLET ORAL 3 TIMES DAILY PRN
Qty: 12 TABLET | Refills: 0 | Status: SHIPPED | OUTPATIENT
Start: 2023-05-15

## 2023-05-15 RX ORDER — SODIUM CHLORIDE 0.9 % (FLUSH) 0.9 %
10 SYRINGE (ML) INJECTION AS NEEDED
Status: DISCONTINUED | OUTPATIENT
Start: 2023-05-15 | End: 2023-05-15 | Stop reason: HOSPADM

## 2023-05-15 RX ORDER — METOCLOPRAMIDE HYDROCHLORIDE 5 MG/ML
10 INJECTION INTRAMUSCULAR; INTRAVENOUS ONCE
Status: COMPLETED | OUTPATIENT
Start: 2023-05-15 | End: 2023-05-15

## 2023-05-15 RX ADMIN — METOCLOPRAMIDE 10 MG: 5 INJECTION, SOLUTION INTRAMUSCULAR; INTRAVENOUS at 09:21

## 2023-05-15 RX ADMIN — SODIUM CHLORIDE, POTASSIUM CHLORIDE, SODIUM LACTATE AND CALCIUM CHLORIDE 1000 ML: 600; 310; 30; 20 INJECTION, SOLUTION INTRAVENOUS at 09:22

## 2023-05-15 NOTE — ED PROVIDER NOTES
Subjective   History of Present Illness  Chief complaint vomiting diarrhea abdominal pain    History of present is 22-year-old who presents ER with a less than 24-hour history of nausea vomiting diarrhea and abdominal cramping.  No fever chills or sweats no one in the home with similar illness no foreign travels antibiotic use or recent hospitalization no urinary complaints no pregnancy concerns.  Nothing really.  Nothing seems to make it better or worse.  Pain is nonradiating.  Cramping in nature.  No urinary complaints no discharge or worry of STD        Review of Systems   Constitutional: Negative for chills and fever.   Respiratory: Negative for chest tightness and shortness of breath.    Gastrointestinal: Positive for abdominal pain, diarrhea and vomiting.   Endocrine: Negative for cold intolerance and heat intolerance.   Genitourinary: Negative for difficulty urinating and dysuria.   Musculoskeletal: Negative for back pain and neck pain.   Neurological: Negative for dizziness and light-headedness.       Past Medical History:   Diagnosis Date   • Anxiety    • Heart murmur    • PTSD (post-traumatic stress disorder)        Allergies   Allergen Reactions   • Promethazine Unknown - High Severity       Past Surgical History:   Procedure Laterality Date   • ADENOIDECTOMY     • TONSILLECTOMY         Family History   Problem Relation Age of Onset   • Anxiety disorder Mother    • Arthritis Mother    • Asthma Mother    • Cancer Mother    • Depression Mother    • Developmental Disability Mother    • Diabetes Mother    • Hearing loss Mother    • Hyperlipidemia Mother    • Hypertension Mother    • Mental illness Mother    • Stroke Mother    • COPD Father    • Hyperlipidemia Father    • Hypertension Father    • Depression Father    • Diabetes Maternal Grandmother    • Cancer Maternal Grandmother    • Hypertension Maternal Grandmother    • Hyperlipidemia Maternal Grandmother    • COPD Maternal Grandfather    • Asthma Maternal  Grandfather    • Cancer Maternal Grandfather    • Diabetes Paternal Grandmother    • Cancer Paternal Grandmother    • Hypertension Paternal Grandmother    • Hyperlipidemia Paternal Grandmother    • COPD Paternal Grandfather    • Heart disease Paternal Grandfather        Social History     Socioeconomic History   • Marital status: Single   Tobacco Use   • Smoking status: Never   • Smokeless tobacco: Never   Vaping Use   • Vaping Use: Never used   Substance and Sexual Activity   • Drug use: Never   • Sexual activity: Defer     Prior to Admission medications    Medication Sig Start Date End Date Taking? Authorizing Provider   cetirizine (ZyrTEC Allergy) 10 MG tablet ZYRTEC ALLERGY 10 MG TABS 9/28/16   Kaylee Ferris MD   loratadine (Claritin) 10 MG tablet CLARITIN 10 MG TABS 9/28/16   Kaylee Ferris MD   metoclopramide (REGLAN) 10 MG tablet Take 1 tablet by mouth 3 (Three) Times a Day As Needed (Nausea vomiting). 5/15/23   Zaid Weaver MD   ondansetron ODT (ZOFRAN-ODT) 4 MG disintegrating tablet Place 1 tablet on the tongue Every 6 (Six) Hours As Needed for Nausea or Vomiting. 4/17/23   Zaid Weaver MD   SUMAtriptan (Imitrex) 25 MG tablet IMITREX 25 MG TABS 9/28/16   Kaylee Ferris MD   topiramate (Topamax) 25 MG tablet TOPAMAX 25 MG TABS 9/28/16   Kaylee Ferris MD     Patient is not on Reglan the patient is on testosterone      Objective   Physical Exam  Constitutional this is a 22-year-old female awake alert no acute distress triage vital signs reviewed.  HEENT extraocular muscles are intact pupils equal round reactive sclera clear mouth clear moist neck supple no adenopathy no meningeal signs lungs clear no retractions back no CVA tenderness heart regular without murmur.  Abdomen soft nontender nondistended good bowel sounds no peritoneal findings or other masses extremities no edema cords or Homans' sign or evidence of DVT skin warm dry without rashes neurologic awake alert and  follows commands monitorings normal without focal weakness  Procedures           ED Course      Results for orders placed or performed during the hospital encounter of 05/15/23   Comprehensive Metabolic Panel    Specimen: Blood   Result Value Ref Range    Glucose 108 (H) 65 - 99 mg/dL    BUN 17 6 - 20 mg/dL    Creatinine 0.68 0.57 - 1.00 mg/dL    Sodium 138 136 - 145 mmol/L    Potassium 4.0 3.5 - 5.2 mmol/L    Chloride 100 98 - 107 mmol/L    CO2 24.0 22.0 - 29.0 mmol/L    Calcium 8.8 8.6 - 10.5 mg/dL    Total Protein 7.4 6.0 - 8.5 g/dL    Albumin 4.4 3.5 - 5.2 g/dL    ALT (SGPT) 12 1 - 33 U/L    AST (SGOT) 15 1 - 32 U/L    Alkaline Phosphatase 66 39 - 117 U/L    Total Bilirubin 0.8 0.0 - 1.2 mg/dL    Globulin 3.0 gm/dL    A/G Ratio 1.5 g/dL    BUN/Creatinine Ratio 25.0 7.0 - 25.0    Anion Gap 14.0 5.0 - 15.0 mmol/L    eGFR 126.5 >60.0 mL/min/1.73   Lipase    Specimen: Blood   Result Value Ref Range    Lipase 23 13 - 60 U/L   Urinalysis With Microscopic If Indicated (No Culture) - Urine, Clean Catch    Specimen: Urine, Clean Catch   Result Value Ref Range    Color, UA Dark Yellow (A) Yellow, Straw    Appearance, UA Clear Clear    pH, UA <=5.0 5.0 - 8.0    Specific Gravity, UA 1.034 (H) 1.005 - 1.030    Glucose, UA Negative Negative    Ketones, UA Negative Negative    Bilirubin, UA Negative Negative    Blood, UA Negative Negative    Protein, UA Negative Negative    Leuk Esterase, UA Negative Negative    Nitrite, UA Negative Negative    Urobilinogen, UA 0.2 E.U./dL 0.2 - 1.0 E.U./dL   Pregnancy, Urine - Urine, Clean Catch    Specimen: Urine, Clean Catch   Result Value Ref Range    HCG, Urine QL Negative Negative   CBC Auto Differential    Specimen: Blood   Result Value Ref Range    WBC 8.10 3.40 - 10.80 10*3/mm3    RBC 4.78 3.77 - 5.28 10*6/mm3    Hemoglobin 13.7 12.0 - 15.9 g/dL    Hematocrit 40.5 34.0 - 46.6 %    MCV 84.8 79.0 - 97.0 fL    MCH 28.7 26.6 - 33.0 pg    MCHC 33.8 31.5 - 35.7 g/dL    RDW 14.1 12.3 - 15.4  %    RDW-SD 42.0 37.0 - 54.0 fl    MPV 8.5 6.0 - 12.0 fL    Platelets 274 140 - 450 10*3/mm3    Neutrophil % 88.3 (H) 42.7 - 76.0 %    Lymphocyte % 7.5 (L) 19.6 - 45.3 %    Monocyte % 3.8 (L) 5.0 - 12.0 %    Eosinophil % 0.1 (L) 0.3 - 6.2 %    Basophil % 0.3 0.0 - 1.5 %    Neutrophils, Absolute 7.10 (H) 1.70 - 7.00 10*3/mm3    Lymphocytes, Absolute 0.60 (L) 0.70 - 3.10 10*3/mm3    Monocytes, Absolute 0.30 0.10 - 0.90 10*3/mm3    Eosinophils, Absolute 0.00 0.00 - 0.40 10*3/mm3    Basophils, Absolute 0.00 0.00 - 0.20 10*3/mm3    nRBC 0.1 0.0 - 0.2 /100 WBC     US Abdomen Limited    Result Date: 5/15/2023  Impression: Normal right upper quadrant abdominal ultrasound. Electronically Signed: Chelsy Thomas  5/15/2023 10:57 AM EDT  Workstation ID: [id number]    Medications   lactated ringers bolus 1,000 mL (0 mL Intravenous Stopped 5/15/23 1237)   metoclopramide (REGLAN) injection 10 mg (10 mg Intravenous Given 5/15/23 0921)                                            Medical Decision Making  Medical decision making.  IV established monitor placed my review sinus rhythm was given a liter lactated Ringer's patient was given Reglan 10 mg IV.  Labs obtained independent reviewed by me.  Competence metabolic profile liver lipase CBC unremarkable urine negative pregnancy test negative.  Ultrasound obtained of the right quadrant gallbladder reviewed by radiology report reviewed by me.  Report to be normal.  The patient repeat exam was taking p.o. fluids she is feeling much better and was soft nontender good bowel sounds there was no peritoneal findings or pulsatile masses.  She has had no vomiting or diarrhea.  We talked about the findings.  Did not see need do a CAT scan of her abdomen as it does not really hurt at this point I do not see evidence of an acute intra-abdominal process.  I suspect this is probably related to a viral illness.  I do not see evidence suggest acute appendicitis diverticulitis colitis pancreatitis or  ischemic bowel or sepsis by clinical exam.  Not a complete list of all possibilities.  Patient is agreeable to be discharged home for outpatient management we talked about what to return for and is given Reglan to use at home stable unremarkable improved ER course.    Epigastric pain: acute illness or injury  Vomiting and diarrhea: acute illness or injury  Amount and/or Complexity of Data Reviewed  Labs: ordered. Decision-making details documented in ED Course.  Radiology: ordered.      Risk  Prescription drug management.          Final diagnoses:   Vomiting and diarrhea   Epigastric pain       ED Disposition  ED Disposition     ED Disposition   Discharge    Condition   Stable    Comment   --             Kassandra Arreaga PA  [address]    [place] [address] [phone]    In 1 day           Medication List      New Prescriptions    metoclopramide 10 MG tablet  Commonly known as: REGLAN  Take 1 tablet by mouth 3 (Three) Times a Day As Needed (Nausea vomiting).           Where to Get Your Medications      These medications were sent to [email or web address] - [place], [place] - [address] - [phone]  - [phone] FX  [address] [place] [address]    Phone: [phone]   · metoclopramide 10 MG tablet          Zaid Weaver MD  05/16/23 2500

## 2023-05-15 NOTE — Clinical Note
[facility] EMERGENCY DEPARTMENT  [address] [place] [address]  Phone: [phone]    Chelsea Griffin was seen and treated in our emergency department on 5/15/2023.  He may return to work on 05/16/2023.         Thank you for choosing [facility].    Zaid Weaver MD

## 2023-05-15 NOTE — DISCHARGE INSTRUCTIONS
Clear liquids over the next 12 hours and advance slowly.  Reglan sent to your pharmacy  Return for increasing pain fever vomiting blood black or bloody stool no improvement over next 24 hours or any other new or worsening problems or concerns return immediately to the ER   Patient expressed no known problems or needs

## 2023-10-01 ENCOUNTER — APPOINTMENT (OUTPATIENT)
Dept: GENERAL RADIOLOGY | Facility: HOSPITAL | Age: 23
End: 2023-10-01
Payer: COMMERCIAL

## 2023-10-01 ENCOUNTER — HOSPITAL ENCOUNTER (EMERGENCY)
Facility: HOSPITAL | Age: 23
Discharge: HOME | End: 2023-10-01
Attending: EMERGENCY MEDICINE | Admitting: EMERGENCY MEDICINE
Payer: COMMERCIAL

## 2023-10-01 VITALS
HEART RATE: 65 BPM | DIASTOLIC BLOOD PRESSURE: 73 MMHG | SYSTOLIC BLOOD PRESSURE: 141 MMHG | HEIGHT: 69 IN | RESPIRATION RATE: 18 BRPM | WEIGHT: 293 LBS | BODY MASS INDEX: 43.4 KG/M2 | TEMPERATURE: 98.7 F | OXYGEN SATURATION: 95 %

## 2023-10-01 DIAGNOSIS — J40 BRONCHITIS: Primary | ICD-10-CM

## 2023-10-01 LAB
FLUAV SUBTYP SPEC NAA+PROBE: NOT DETECTED
FLUBV RNA ISLT QL NAA+PROBE: NOT DETECTED
S PYO AG THROAT QL: NEGATIVE
SARS-COV-2 RNA RESP QL NAA+PROBE: NOT DETECTED

## 2023-10-01 PROCEDURE — 71046 X-RAY EXAM CHEST 2 VIEWS: CPT

## 2023-10-01 PROCEDURE — 87651 STREP A DNA AMP PROBE: CPT | Performed by: NURSE PRACTITIONER

## 2023-10-01 PROCEDURE — 87636 SARSCOV2 & INF A&B AMP PRB: CPT | Performed by: NURSE PRACTITIONER

## 2023-10-01 PROCEDURE — 99283 EMERGENCY DEPT VISIT LOW MDM: CPT

## 2023-10-01 RX ORDER — ALBUTEROL SULFATE 90 UG/1
2 AEROSOL, METERED RESPIRATORY (INHALATION) EVERY 4 HOURS PRN
Qty: 6.7 G | Refills: 0 | Status: SHIPPED | OUTPATIENT
Start: 2023-10-01

## 2023-10-01 NOTE — Clinical Note
[facility] EMERGENCY DEPARTMENT  [address] [place] [address]  Phone: [phone]    Chelsea Griffin was seen and treated in our emergency department on 10/1/2023.  He may return to work on 10/02/2023.         Thank you for choosing [facility].    Nicci Bucio, OSMAN

## 2023-10-01 NOTE — ED PROVIDER NOTES
Subjective   Chief Complaint   Patient presents with    Cough     Pt reports productive cough with clear/whitish sputum, increased fatigue and chills for the past week.  Pt reports was seen at [facility] on 09/25 for same symptoms.        History of Present Illness  Patient is a 23-year-old presents to the emergency department with a complaint of cough, fatigue sore throat.  Denies fever.  No chest pain shortness of breath.  No nausea vomiting diarrhea.  Review of Systems   Constitutional:  Positive for fatigue. Negative for chills and fever.   HENT:  Positive for congestion and sore throat.    Respiratory:  Negative for shortness of breath.    Cardiovascular:  Negative for chest pain.   Gastrointestinal:  Negative for abdominal pain, diarrhea, nausea and vomiting.   Musculoskeletal:  Negative for back pain, neck pain and neck stiffness.     Past Medical History:   Diagnosis Date    Anxiety     Heart murmur     PTSD (post-traumatic stress disorder)        Allergies   Allergen Reactions    Promethazine Unknown - High Severity       Past Surgical History:   Procedure Laterality Date    ADENOIDECTOMY      TONSILLECTOMY         Family History   Problem Relation Age of Onset    Anxiety disorder Mother     Arthritis Mother     Asthma Mother     Cancer Mother     Depression Mother     Developmental Disability Mother     Diabetes Mother     Hearing loss Mother     Hyperlipidemia Mother     Hypertension Mother     Mental illness Mother     Stroke Mother     COPD Father     Hyperlipidemia Father     Hypertension Father     Depression Father     Diabetes Maternal Grandmother     Cancer Maternal Grandmother     Hypertension Maternal Grandmother     Hyperlipidemia Maternal Grandmother     COPD Maternal Grandfather     Asthma Maternal Grandfather     Cancer Maternal Grandfather     Diabetes Paternal Grandmother     Cancer Paternal Grandmother     Hypertension Paternal Grandmother     Hyperlipidemia Paternal Grandmother      "COPD Paternal Grandfather     Heart disease Paternal Grandfather        Social History     Socioeconomic History    Marital status: Single   Tobacco Use    Smoking status: Never    Smokeless tobacco: Never   Vaping Use    Vaping Use: Never used   Substance and Sexual Activity    Drug use: Never    Sexual activity: Defer           Objective   Physical Exam  Vitals and nursing note reviewed.   Constitutional:       Appearance: Normal appearance.   HENT:      Head: Normocephalic and atraumatic.      Nose: Nose normal.      Mouth/Throat:      Mouth: Mucous membranes are moist.      Pharynx: Oropharynx is clear.   Eyes:      Extraocular Movements: Extraocular movements intact.      Conjunctiva/sclera: Conjunctivae normal.      Pupils: Pupils are equal, round, and reactive to light.   Cardiovascular:      Rate and Rhythm: Normal rate and regular rhythm.      Heart sounds: Normal heart sounds.   Pulmonary:      Effort: Pulmonary effort is normal.      Breath sounds: Normal breath sounds.   Abdominal:      General: Bowel sounds are normal.      Palpations: Abdomen is soft.   Musculoskeletal:         General: Normal range of motion.      Cervical back: Normal range of motion and neck supple.   Skin:     General: Skin is warm and dry.      Capillary Refill: Capillary refill takes less than 2 seconds.   Neurological:      Mental Status: He is alert and oriented to person, place, and time.       Procedures           ED Course      /81   Pulse 65   Temp 98.7 °F (37.1 °C) (Temporal)   Resp 18   Ht 175.3 cm (69\")   Wt (!) 138 kg (304 lb 0.2 oz)   LMP  (LMP Unknown) Comment: Patient is taking testosterone  SpO2 98%   BMI 44.90 kg/m²   Medications - No data to display  XR Chest 2 View    Result Date: 10/1/2023  Impression: No active disease Electronically Signed: Hernan Lin MD  10/1/2023 8:10 PM EDT  Workstation ID: [id number]   Lab Results (last 24 hours)       Procedure Component Value Units Date/Time    COVID-19 and " FLU A/B PCR - Swab, Nasopharynx [[id number]]  (Normal) Collected: 10/01/23 1958    Specimen: Swab from Nasopharynx Updated: 10/01/23 2022     COVID19 Not Detected     Influenza A PCR Not Detected     Influenza B PCR Not Detected    Narrative:      Fact sheet for providers: https://www.fda.gov/media/294587/download    Fact sheet for patients: https://www.fda.gov/media/143817/download    Test performed by PCR.    Rapid Strep A Screen - Swab, Throat [[id number]]  (Normal) Collected: 10/01/23 1958    Specimen: Swab from Throat Updated: 10/01/23 2016     Strep A Ag Negative                                               Medical Decision Making  Chart review visit to [facility] 9/25/2023    Amount and/or Complexity of Data Reviewed  Radiology: ordered.    Patient is a 23-year-old presents to the ED with complaint of cough, congestion, sore throat, fatigue.  Recently seen at [facility] ED, placed on allergy medication, as well as steroid.  Completing steroid tonight.  Differentiall diagnoses considered for patient presentation, this list is not all inclusive of diagnoses considered: Viral syndrome, COVID, flu, strep, pneumonia.  Flu COVID and strep are negative.  No pneumonia on chest x-ray.  We will treat as viral bronchitis.  Advised continuation of current steroids, added albuterol.  I spoke with the patient at the bedside regarding their plan of care, discharge instruction,  prescriptions, as well as reasons to return to the emergency department.  We discussed test results at the bedside, including incidental abnormal labs, radiological findings, understands need and importance  for follow-up with primary care or specialist if indicated.  Patient verbalizes understanding and agrees to the treatment plan at this time.  Note Disclaimer: At [facility], we believe that sharing information builds trust and better relationships. You are receiving this note because you recently visited [facility]. It is  possible you will see health information before a provider has talked with you about it. This kind of information can be easy to misunderstand. To help you fully understand what it means for your health, we urge you to discuss this note with your provider.Note dictated utilizing Dragon Dictation. Appropriate PPE worn during patient interactions.            Final diagnoses:   Bronchitis       ED Disposition  ED Disposition       ED Disposition   Discharge    Condition   Stable    Comment   --               Kassandra Arreaga PA  [address]  [facility] [address]  [place] [address] [phone]    Schedule an appointment as soon as possible for a visit       [facility] EMERGENCY DEPARTMENT  [facility] [facility] [address] [phone]    As needed, If symptoms worsen         Medication List        New Prescriptions      albuterol sulfate  (90 Base) MCG/ACT inhaler  Commonly known as: PROVENTIL HFA;VENTOLIN HFA;PROAIR HFA  Inhale 2 puffs Every 4 (Four) Hours As Needed for Wheezing.               Where to Get Your Medications        These medications were sent to [facility] - [place], [place] - [address] - [phone]  - [phone] FX  [address] [place] [address]      Phone: [phone]   albuterol sulfate  (90 Base) MCG/ACT inhaler            Cici Villarreal APRN  10/01/23 2043

## 2023-10-02 NOTE — DISCHARGE INSTRUCTIONS
Follow up with PCP, call for an appointment in 1-2 days, if you do not have a primary care provider please use patient connection [phone] to help establish care  Follow up with any specialist as indicated and discussed  Return to the ED for new or worsening symptoms  Take any medications as prescribed

## 2023-11-28 ENCOUNTER — HOSPITAL ENCOUNTER (EMERGENCY)
Facility: HOSPITAL | Age: 23
Discharge: HOME | End: 2023-11-28
Attending: EMERGENCY MEDICINE | Admitting: EMERGENCY MEDICINE
Payer: COMMERCIAL

## 2023-11-28 VITALS
DIASTOLIC BLOOD PRESSURE: 86 MMHG | SYSTOLIC BLOOD PRESSURE: 125 MMHG | HEART RATE: 84 BPM | RESPIRATION RATE: 16 BRPM | OXYGEN SATURATION: 97 % | WEIGHT: 293 LBS | BODY MASS INDEX: 43.4 KG/M2 | TEMPERATURE: 98 F | HEIGHT: 69 IN

## 2023-11-28 DIAGNOSIS — J06.9 VIRAL URI: Primary | ICD-10-CM

## 2023-11-28 PROCEDURE — 87651 STREP A DNA AMP PROBE: CPT | Performed by: EMERGENCY MEDICINE

## 2023-11-28 PROCEDURE — 99283 EMERGENCY DEPT VISIT LOW MDM: CPT

## 2023-11-28 PROCEDURE — 87636 SARSCOV2 & INF A&B AMP PRB: CPT | Performed by: EMERGENCY MEDICINE

## 2023-11-28 NOTE — Clinical Note
[facility] EMERGENCY DEPARTMENT  [address] [place] [address]  Phone: [phone]    Chelsea Griffin was seen and treated in our emergency department on 11/28/2023.  He may return to work on 11/30/2023.         Thank you for choosing [facility].    Hernan Wheat MD

## 2023-11-28 NOTE — Clinical Note
[facility] EMERGENCY DEPARTMENT  [address] [place] [address]  Phone: [phone]    Chelsea Griffin was seen and treated in our emergency department on 11/28/2023.  He may return to work on 11/30/2023.         Thank you for choosing [facility].    Marilynn Garcia RN

## 2023-11-29 NOTE — ED PROVIDER NOTES
Subjective   History of Present Illness  Patient is a 20-year-old female, sore throat cough and congestion.  Patient denies other complaints      Review of Systems    Past Medical History:   Diagnosis Date    Anxiety     Heart murmur     PTSD (post-traumatic stress disorder)        Allergies   Allergen Reactions    Promethazine Unknown - High Severity       Past Surgical History:   Procedure Laterality Date    ADENOIDECTOMY      TONSILLECTOMY         Family History   Problem Relation Age of Onset    Anxiety disorder Mother     Arthritis Mother     Asthma Mother     Cancer Mother     Depression Mother     Developmental Disability Mother     Diabetes Mother     Hearing loss Mother     Hyperlipidemia Mother     Hypertension Mother     Mental illness Mother     Stroke Mother     COPD Father     Hyperlipidemia Father     Hypertension Father     Depression Father     Diabetes Maternal Grandmother     Cancer Maternal Grandmother     Hypertension Maternal Grandmother     Hyperlipidemia Maternal Grandmother     COPD Maternal Grandfather     Asthma Maternal Grandfather     Cancer Maternal Grandfather     Diabetes Paternal Grandmother     Cancer Paternal Grandmother     Hypertension Paternal Grandmother     Hyperlipidemia Paternal Grandmother     COPD Paternal Grandfather     Heart disease Paternal Grandfather        Social History     Socioeconomic History    Marital status: Single   Tobacco Use    Smoking status: Never    Smokeless tobacco: Never   Vaping Use    Vaping Use: Never used   Substance and Sexual Activity    Drug use: Never    Sexual activity: Defer           Objective   Physical Exam  HEENT exam shows TMs to be clear.  Oropharynx comers.  Neck has no adenopathy.  Lungs are clear.  Mental exam unremarkable.  Procedures           ED Course      Results for orders placed or performed during the hospital encounter of 11/28/23   Rapid Strep A Screen - Swab, Throat    Specimen: Throat; Swab   Result Value Ref Range     Strep A Ag Negative Negative   COVID-19 and FLU A/B PCR, 1 HR TAT - Swab, Nasopharynx    Specimen: Nasopharynx; Swab   Result Value Ref Range    COVID19 Not Detected Not Detected - Ref. Range    Influenza A PCR Not Detected Not Detected    Influenza B PCR Not Detected Not Detected                                            Medical Decision Making  Patient strep test is negative.  Flu and COVID are negative as well.  Patient be discharged with viral URI.  Patient is on ibuprofen and see the MD for recheck as needed.    Amount and/or Complexity of Data Reviewed  Labs: ordered. Decision-making details documented in ED Course.        Final diagnoses:   Viral URI       ED Disposition  ED Disposition       ED Disposition   Discharge    Condition   Stable    Comment   --               No follow-up provider specified.       Medication List      No changes were made to your prescriptions during this visit.            Hernan Wheat MD  11/28/23 2835

## 2023-12-11 ENCOUNTER — APPOINTMENT (OUTPATIENT)
Dept: GENERAL RADIOLOGY | Facility: HOSPITAL | Age: 23
End: 2023-12-11
Payer: COMMERCIAL

## 2023-12-11 ENCOUNTER — HOSPITAL ENCOUNTER (EMERGENCY)
Facility: HOSPITAL | Age: 23
Discharge: HOME | End: 2023-12-11
Attending: EMERGENCY MEDICINE | Admitting: EMERGENCY MEDICINE
Payer: COMMERCIAL

## 2023-12-11 VITALS
TEMPERATURE: 98 F | SYSTOLIC BLOOD PRESSURE: 118 MMHG | HEART RATE: 83 BPM | WEIGHT: 293 LBS | RESPIRATION RATE: 18 BRPM | DIASTOLIC BLOOD PRESSURE: 46 MMHG | HEIGHT: 69 IN | BODY MASS INDEX: 43.4 KG/M2 | OXYGEN SATURATION: 99 %

## 2023-12-11 DIAGNOSIS — R05.1 ACUTE COUGH: ICD-10-CM

## 2023-12-11 DIAGNOSIS — R11.2 NAUSEA AND VOMITING, UNSPECIFIED VOMITING TYPE: ICD-10-CM

## 2023-12-11 DIAGNOSIS — B34.0 ADENOVIRUS INFECTION: Primary | ICD-10-CM

## 2023-12-11 LAB
ALBUMIN SERPL-MCNC: 3.9 G/DL (ref 3.5–5.2)
ALBUMIN/GLOB SERPL: 1.1 G/DL
ALP SERPL-CCNC: 79 U/L (ref 39–117)
ALT SERPL W P-5'-P-CCNC: 22 U/L (ref 1–33)
ANION GAP SERPL CALCULATED.3IONS-SCNC: 12 MMOL/L (ref 5–15)
AST SERPL-CCNC: 27 U/L (ref 1–32)
B PARAPERT DNA SPEC QL NAA+PROBE: NOT DETECTED
B PERT DNA SPEC QL NAA+PROBE: NOT DETECTED
B-HCG UR QL: NEGATIVE
BACTERIA UR QL AUTO: ABNORMAL /HPF
BASOPHILS # BLD AUTO: 0 10*3/MM3 (ref 0–0.2)
BASOPHILS NFR BLD AUTO: 0.3 % (ref 0–1.5)
BILIRUB SERPL-MCNC: 0.7 MG/DL (ref 0–1.2)
BILIRUB UR QL STRIP: NEGATIVE
BUN SERPL-MCNC: 13 MG/DL (ref 6–20)
BUN/CREAT SERPL: 20 (ref 7–25)
C PNEUM DNA NPH QL NAA+NON-PROBE: NOT DETECTED
CALCIUM SPEC-SCNC: 8.9 MG/DL (ref 8.6–10.5)
CHLORIDE SERPL-SCNC: 99 MMOL/L (ref 98–107)
CLARITY UR: CLEAR
CO2 SERPL-SCNC: 23 MMOL/L (ref 22–29)
COLOR UR: YELLOW
CREAT SERPL-MCNC: 0.65 MG/DL (ref 0.57–1)
DEPRECATED RDW RBC AUTO: 41.6 FL (ref 37–54)
EGFRCR SERPLBLD CKD-EPI 2021: 127.1 ML/MIN/1.73
EOSINOPHIL # BLD AUTO: 0 10*3/MM3 (ref 0–0.4)
EOSINOPHIL NFR BLD AUTO: 0.2 % (ref 0.3–6.2)
ERYTHROCYTE [DISTWIDTH] IN BLOOD BY AUTOMATED COUNT: 13.5 % (ref 12.3–15.4)
FLUAV SUBTYP SPEC NAA+PROBE: NOT DETECTED
FLUBV RNA ISLT QL NAA+PROBE: NOT DETECTED
GLOBULIN UR ELPH-MCNC: 3.4 GM/DL
GLUCOSE SERPL-MCNC: 119 MG/DL (ref 65–99)
GLUCOSE UR STRIP-MCNC: NEGATIVE MG/DL
HADV DNA SPEC NAA+PROBE: DETECTED
HCOV 229E RNA SPEC QL NAA+PROBE: NOT DETECTED
HCOV HKU1 RNA SPEC QL NAA+PROBE: NOT DETECTED
HCOV NL63 RNA SPEC QL NAA+PROBE: NOT DETECTED
HCOV OC43 RNA SPEC QL NAA+PROBE: NOT DETECTED
HCT VFR BLD AUTO: 40.1 % (ref 34–46.6)
HGB BLD-MCNC: 13.3 G/DL (ref 12–15.9)
HGB UR QL STRIP.AUTO: NEGATIVE
HMPV RNA NPH QL NAA+NON-PROBE: NOT DETECTED
HPIV1 RNA ISLT QL NAA+PROBE: NOT DETECTED
HPIV2 RNA SPEC QL NAA+PROBE: NOT DETECTED
HPIV3 RNA NPH QL NAA+PROBE: NOT DETECTED
HPIV4 P GENE NPH QL NAA+PROBE: NOT DETECTED
HYALINE CASTS UR QL AUTO: ABNORMAL /LPF
KETONES UR QL STRIP: ABNORMAL
LEUKOCYTE ESTERASE UR QL STRIP.AUTO: ABNORMAL
LIPASE SERPL-CCNC: 33 U/L (ref 13–60)
LYMPHOCYTES # BLD AUTO: 1.2 10*3/MM3 (ref 0.7–3.1)
LYMPHOCYTES NFR BLD AUTO: 11 % (ref 19.6–45.3)
M PNEUMO IGG SER IA-ACNC: NOT DETECTED
MCH RBC QN AUTO: 27.8 PG (ref 26.6–33)
MCHC RBC AUTO-ENTMCNC: 33.1 G/DL (ref 31.5–35.7)
MCV RBC AUTO: 83.9 FL (ref 79–97)
MONOCYTES # BLD AUTO: 0.4 10*3/MM3 (ref 0.1–0.9)
MONOCYTES NFR BLD AUTO: 3.4 % (ref 5–12)
NEUTROPHILS NFR BLD AUTO: 85.1 % (ref 42.7–76)
NEUTROPHILS NFR BLD AUTO: 9.4 10*3/MM3 (ref 1.7–7)
NITRITE UR QL STRIP: NEGATIVE
NRBC BLD AUTO-RTO: 0.1 /100 WBC (ref 0–0.2)
PH UR STRIP.AUTO: 6.5 [PH] (ref 5–8)
PLATELET # BLD AUTO: 286 10*3/MM3 (ref 140–450)
PMV BLD AUTO: 9.1 FL (ref 6–12)
POTASSIUM SERPL-SCNC: 4.1 MMOL/L (ref 3.5–5.2)
PROT SERPL-MCNC: 7.3 G/DL (ref 6–8.5)
PROT UR QL STRIP: NEGATIVE
RBC # BLD AUTO: 4.77 10*6/MM3 (ref 3.77–5.28)
RBC # UR STRIP: ABNORMAL /HPF
REF LAB TEST METHOD: ABNORMAL
RHINOVIRUS RNA SPEC NAA+PROBE: NOT DETECTED
RSV RNA NPH QL NAA+NON-PROBE: NOT DETECTED
SARS-COV-2 RNA NPH QL NAA+NON-PROBE: NOT DETECTED
SODIUM SERPL-SCNC: 134 MMOL/L (ref 136–145)
SP GR UR STRIP: 1.03 (ref 1–1.03)
SQUAMOUS #/AREA URNS HPF: ABNORMAL /HPF
UROBILINOGEN UR QL STRIP: ABNORMAL
WBC # UR STRIP: ABNORMAL /HPF
WBC NRBC COR # BLD AUTO: 11.1 10*3/MM3 (ref 3.4–10.8)

## 2023-12-11 PROCEDURE — 0202U NFCT DS 22 TRGT SARS-COV-2: CPT

## 2023-12-11 PROCEDURE — 96374 THER/PROPH/DIAG INJ IV PUSH: CPT

## 2023-12-11 PROCEDURE — 80053 COMPREHEN METABOLIC PANEL: CPT

## 2023-12-11 PROCEDURE — 85025 COMPLETE CBC W/AUTO DIFF WBC: CPT

## 2023-12-11 PROCEDURE — 83690 ASSAY OF LIPASE: CPT

## 2023-12-11 PROCEDURE — 81001 URINALYSIS AUTO W/SCOPE: CPT

## 2023-12-11 PROCEDURE — 71045 X-RAY EXAM CHEST 1 VIEW: CPT

## 2023-12-11 PROCEDURE — 25010000002 ONDANSETRON PER 1 MG

## 2023-12-11 PROCEDURE — 81025 URINE PREGNANCY TEST: CPT

## 2023-12-11 PROCEDURE — 99283 EMERGENCY DEPT VISIT LOW MDM: CPT

## 2023-12-11 PROCEDURE — 25810000003 SODIUM CHLORIDE 0.9 % SOLUTION

## 2023-12-11 RX ORDER — BROMPHENIRAMINE MALEATE, PSEUDOEPHEDRINE HYDROCHLORIDE, AND DEXTROMETHORPHAN HYDROBROMIDE 2; 30; 10 MG/5ML; MG/5ML; MG/5ML
5 SYRUP ORAL 4 TIMES DAILY PRN
Qty: 100 ML | Refills: 0 | Status: SHIPPED | OUTPATIENT
Start: 2023-12-11 | End: 2023-12-16

## 2023-12-11 RX ORDER — SODIUM CHLORIDE 0.9 % (FLUSH) 0.9 %
10 SYRINGE (ML) INJECTION AS NEEDED
Status: DISCONTINUED | OUTPATIENT
Start: 2023-12-11 | End: 2023-12-12 | Stop reason: HOSPADM

## 2023-12-11 RX ORDER — ONDANSETRON 4 MG/1
4 TABLET, ORALLY DISINTEGRATING ORAL EVERY 8 HOURS PRN
Qty: 15 TABLET | Refills: 0 | Status: SHIPPED | OUTPATIENT
Start: 2023-12-11 | End: 2023-12-16

## 2023-12-11 RX ORDER — ONDANSETRON 2 MG/ML
4 INJECTION INTRAMUSCULAR; INTRAVENOUS ONCE
Status: COMPLETED | OUTPATIENT
Start: 2023-12-11 | End: 2023-12-11

## 2023-12-11 RX ADMIN — ONDANSETRON 4 MG: 2 INJECTION INTRAMUSCULAR; INTRAVENOUS at 20:40

## 2023-12-11 RX ADMIN — SODIUM CHLORIDE 500 ML: 9 INJECTION, SOLUTION INTRAVENOUS at 20:39

## 2023-12-11 NOTE — Clinical Note
[facility] EMERGENCY DEPARTMENT  [address] [place] [address]  Phone: [phone]    Chelsea Griffin was seen and treated in our emergency department on 12/11/2023.  He may return to work on 12/13/2023.         Thank you for choosing [facility].    Sabra Webb RN

## 2023-12-12 NOTE — ED PROVIDER NOTES
"Subjective   History of Present Illness  Patient is a 23-year-old obese  female with a history of anxiety and PTSD who presents the emergency room with complaints of nausea and vomiting that started last night and a cough that has been ongoing \"since my last visit here.\"  Patient states that she has had 10-12 bouts of vomiting since last night.  She is chilled but could not check her temperature and has had diarrhea.  She has had no significant abdominal pain or dysuria and denies any possible pregnancy.  She has not had any recent use of antibiotics.  She drinks alcohol recreationally on occasion but denies use of drugs and tobacco.  Her only allergy is Phenergan.  She does not take any medication other than Benadryl and a daily basis.      Review of Systems   Constitutional:  Positive for chills.   HENT:  Negative for congestion and rhinorrhea.    Respiratory:  Positive for cough. Negative for shortness of breath.    Gastrointestinal:  Positive for diarrhea, nausea and vomiting. Negative for abdominal pain.   Genitourinary:  Negative for dysuria.   Neurological:  Negative for syncope and headaches.   Psychiatric/Behavioral:  Negative for confusion. The patient is not nervous/anxious.    All other systems reviewed and are negative.      Past Medical History:   Diagnosis Date    Anxiety     Heart murmur     PTSD (post-traumatic stress disorder)        Allergies   Allergen Reactions    Promethazine Unknown - High Severity       Past Surgical History:   Procedure Laterality Date    ADENOIDECTOMY      TONSILLECTOMY         Family History   Problem Relation Age of Onset    Anxiety disorder Mother     Arthritis Mother     Asthma Mother     Cancer Mother     Depression Mother     Developmental Disability Mother     Diabetes Mother     Hearing loss Mother     Hyperlipidemia Mother     Hypertension Mother     Mental illness Mother     Stroke Mother     COPD Father     Hyperlipidemia Father     Hypertension Father  " "   Depression Father     Diabetes Maternal Grandmother     Cancer Maternal Grandmother     Hypertension Maternal Grandmother     Hyperlipidemia Maternal Grandmother     COPD Maternal Grandfather     Asthma Maternal Grandfather     Cancer Maternal Grandfather     Diabetes Paternal Grandmother     Cancer Paternal Grandmother     Hypertension Paternal Grandmother     Hyperlipidemia Paternal Grandmother     COPD Paternal Grandfather     Heart disease Paternal Grandfather        Social History     Socioeconomic History    Marital status: Single   Tobacco Use    Smoking status: Never    Smokeless tobacco: Never   Vaping Use    Vaping Use: Never used   Substance and Sexual Activity    Drug use: Never    Sexual activity: Defer           Objective   Physical Exam  Vitals and nursing note reviewed.   Constitutional:       General: He is not in acute distress.     Appearance: Normal appearance. He is obese. He is not ill-appearing.   HENT:      Head: Normocephalic and atraumatic.   Cardiovascular:      Rate and Rhythm: Normal rate and regular rhythm.      Heart sounds: Murmur heard.   Pulmonary:      Effort: Pulmonary effort is normal. No respiratory distress.      Breath sounds: Normal breath sounds.   Abdominal:      General: Bowel sounds are normal.      Palpations: Abdomen is soft.      Tenderness: There is no abdominal tenderness.   Neurological:      Mental Status: He is alert and oriented to person, place, and time.         Procedures           ED Course      /46   Pulse 83   Temp 98.6 °F (37 °C) (Oral)   Resp 18   Ht 175.3 cm (69\")   Wt 134 kg (296 lb 4.8 oz)   LMP 11/15/2023   SpO2 99%   BMI 43.76 kg/m²   Labs Reviewed   RESPIRATORY PANEL PCR W/ COVID-19 (SARS-COV-2), NP SWAB IN UTM/VTP, 2 HR TAT - Abnormal; Notable for the following components:       Result Value    ADENOVIRUS, PCR Detected (*)     All other components within normal limits    Narrative:     In the setting of a positive respiratory panel " with a viral infection PLUS a negative procalcitonin without other underlying concern for bacterial infection, consider observing off antibiotics or discontinuation of antibiotics and continue supportive care. If the respiratory panel is positive for atypical bacterial infection (Bordetella pertussis, Chlamydophila pneumoniae, or Mycoplasma pneumoniae), consider antibiotic de-escalation to target atypical bacterial infection.   COMPREHENSIVE METABOLIC PANEL - Abnormal; Notable for the following components:    Glucose 119 (*)     Sodium 134 (*)     All other components within normal limits    Narrative:     GFR Normal >60  Chronic Kidney Disease <60  Kidney Failure <15     URINALYSIS W/ MICROSCOPIC IF INDICATED (NO CULTURE) - Abnormal; Notable for the following components:    Ketones, UA 15 mg/dL (1+) (*)     Leuk Esterase, UA Trace (*)     All other components within normal limits   CBC WITH AUTO DIFFERENTIAL - Abnormal; Notable for the following components:    WBC 11.10 (*)     Neutrophil % 85.1 (*)     Lymphocyte % 11.0 (*)     Monocyte % 3.4 (*)     Eosinophil % 0.2 (*)     Neutrophils, Absolute 9.40 (*)     All other components within normal limits   URINALYSIS, MICROSCOPIC ONLY - Abnormal; Notable for the following components:    Bacteria, UA Trace (*)     Squamous Epithelial Cells, UA 3-6 (*)     All other components within normal limits   LIPASE - Normal   PREGNANCY, URINE - Normal   CBC AND DIFFERENTIAL    Narrative:     The following orders were created for panel order CBC & Differential.  Procedure                               Abnormality         Status                     ---------                               -----------         ------                     CBC Auto Differential[[id number]]        Abnormal            Final result                 Please view results for these tests on the individual orders.     Medications   sodium chloride 0.9 % flush 10 mL (has no administration in time range)    ondansetron (ZOFRAN) injection 4 mg (4 mg Intravenous Given 12/11/23 2040)   sodium chloride 0.9 % bolus 500 mL (0 mL Intravenous Stopped 12/11/23 2159)     XR Chest 1 View    Result Date: 12/11/2023  No acute cardiopulmonary abnormality. Electronically Signed: Garland Pierce MD  12/11/2023 8:15 PM EST  Workstation ID: [id number]                                          Medical Decision Making  Problems Addressed:  Acute cough: complicated acute illness or injury  Adenovirus infection: complicated acute illness or injury  Nausea and vomiting, unspecified vomiting type: complicated acute illness or injury    Amount and/or Complexity of Data Reviewed  Labs: ordered.  Radiology: ordered.    Risk  Prescription drug management.    Patient is a pleasant 23-year-old obese  female with a history of PTSD, tonsillectomy and anxiety who presents the emergency room with complaints of nausea, vomiting and diarrhea that started last night and a cough that is been ongoing for about 2 weeks.  Exam is unremarkable with normal S1/S2.  No clicks.  Mild murmur.  Lungs are clear on auscultation in all fields.  Abdomen was found to be soft and generally nontender with hypoactive bowel sounds throughout.  She is alert and not ill-appearing.  Initial differentials include COVID-19, viral illness, gastritis, C. difficile.  This is not a complete list.    IV was established labs were obtained.  Patient received above examination.  Nausea was managed with Zofran and she received a fluid bolus.  CT study was considered but not completed at this time given that patient does not have significant tenderness on exam and remains afebrile.  Urinalysis negative for findings consistent with acute UTI but positive for ketones.  Trace bacteria was noted but it is a contaminated sample, thought to be caused by that versus acute UTI.  Labs are unremarkable.  Leukocytosis of 11.1 noted but this is likely reactive due to patient's  vomiting and  diarrhea.  Adenovirus detected on respiratory panel, which may explain patient's symptoms.  My interpretation of x-ray reveals no pneumothorax, infiltrates or nodules.  This concurrent with radiologist.  Upon reassessment, she reports improvement after medication and fluids.  Results were discussed with patient, who is stable for discharge at this time.  She will be discharged with prescription of Zofran and Bromfed with instruction to follow-up to primary care provider.  She verbalizes understanding and is agreeable to plan of care.  Patient was able to ambulate upright steadily without assistance upon discharge.    I discussed the findings with patient who voices understanding of discharge instructions, signs and symptoms requiring return to the ED; discharged improved and stable condition with follow-up for reevaluation.    Patient is aware that discharge does not mean that nothing is wrong but it indicates no emergency is present and they must continue care with follow-up as given below or physician of their choice.    This document is intended for medical expert use only.  Reading of this document by patients and/or patient's family without participating medical staff guidance may result in misinterpretation and unintended morbidity.  Any interpretation of such data is the responsibility of the patient and/or family member responsible for the patient in concert with their primary or specialist providers, not to be left for sources of online search as such as [employer], [employer] or similar queries.  Relying on these approaches to knowledge may result in misinterpretation, misguided goals of care and even death should patient or family members try recommendations outside of the realm of professional medical care in a supervised inpatient environment.    This medical document was created using Dragon dictation system. Some errors in speech recognition may occur.    Final diagnoses:   Adenovirus infection   Acute cough    Nausea and vomiting, unspecified vomiting type       ED Disposition  ED Disposition       ED Disposition   Discharge    Condition   Stable    Comment   --               Kassandra Arreaga PA  [address]    [place] [id number] [phone]               Medication List        New Prescriptions      brompheniramine-pseudoephedrine-DM 30-2-10 MG/5ML syrup  Take 5 mL by mouth 4 (Four) Times a Day As Needed for Allergies for up to 5 days.     ondansetron ODT 4 MG disintegrating tablet  Commonly known as: ZOFRAN-ODT  Place 1 tablet on the tongue Every 8 (Eight) Hours As Needed for Nausea or Vomiting for up to 5 days.               Where to Get Your Medications        These medications were sent to [facility] - [place], [place] - [address] - [phone]  - [phone] FX  [address] [place] [address]      Phone: [phone]   brompheniramine-pseudoephedrine-DM 30-2-10 MG/5ML syrup  ondansetron ODT 4 MG disintegrating tablet            Maggie Barker, APRN  12/11/23 7460

## 2023-12-12 NOTE — DISCHARGE INSTRUCTIONS
Take Bromfed as needed for cough and congestion.  Take Zofran as needed for nausea.  Push fluids.  Rest.  Treat fever with over-the-counter Tylenol or ibuprofen as needed.    Follow-up with primary care provider as needed.    Return to the ER for new or worsening symptoms.

## 2024-02-06 ENCOUNTER — APPOINTMENT (OUTPATIENT)
Dept: GENERAL RADIOLOGY | Facility: HOSPITAL | Age: 24
End: 2024-02-06
Payer: COMMERCIAL

## 2024-02-06 ENCOUNTER — HOSPITAL ENCOUNTER (EMERGENCY)
Facility: HOSPITAL | Age: 24
Discharge: HOME | End: 2024-02-07
Attending: EMERGENCY MEDICINE | Admitting: EMERGENCY MEDICINE
Payer: COMMERCIAL

## 2024-02-06 DIAGNOSIS — B34.8 RHINOVIRUS INFECTION: Primary | ICD-10-CM

## 2024-02-06 LAB
B PARAPERT DNA SPEC QL NAA+PROBE: NOT DETECTED
B PERT DNA SPEC QL NAA+PROBE: NOT DETECTED
C PNEUM DNA NPH QL NAA+NON-PROBE: NOT DETECTED
FLUAV SUBTYP SPEC NAA+PROBE: NOT DETECTED
FLUBV RNA ISLT QL NAA+PROBE: NOT DETECTED
HADV DNA SPEC NAA+PROBE: NOT DETECTED
HCOV 229E RNA SPEC QL NAA+PROBE: NOT DETECTED
HCOV HKU1 RNA SPEC QL NAA+PROBE: NOT DETECTED
HCOV NL63 RNA SPEC QL NAA+PROBE: NOT DETECTED
HCOV OC43 RNA SPEC QL NAA+PROBE: NOT DETECTED
HMPV RNA NPH QL NAA+NON-PROBE: NOT DETECTED
HPIV1 RNA ISLT QL NAA+PROBE: NOT DETECTED
HPIV2 RNA SPEC QL NAA+PROBE: NOT DETECTED
HPIV3 RNA NPH QL NAA+PROBE: NOT DETECTED
HPIV4 P GENE NPH QL NAA+PROBE: NOT DETECTED
M PNEUMO IGG SER IA-ACNC: NOT DETECTED
RHINOVIRUS RNA SPEC NAA+PROBE: DETECTED
RSV RNA NPH QL NAA+NON-PROBE: NOT DETECTED
S PYO AG THROAT QL: NEGATIVE
SARS-COV-2 RNA NPH QL NAA+NON-PROBE: NOT DETECTED

## 2024-02-06 PROCEDURE — 63710000001 ONDANSETRON ODT 4 MG TABLET DISPERSIBLE: Performed by: EMERGENCY MEDICINE

## 2024-02-06 PROCEDURE — 0202U NFCT DS 22 TRGT SARS-COV-2: CPT | Performed by: EMERGENCY MEDICINE

## 2024-02-06 PROCEDURE — 99283 EMERGENCY DEPT VISIT LOW MDM: CPT

## 2024-02-06 PROCEDURE — 71046 X-RAY EXAM CHEST 2 VIEWS: CPT

## 2024-02-06 PROCEDURE — 87651 STREP A DNA AMP PROBE: CPT | Performed by: EMERGENCY MEDICINE

## 2024-02-06 RX ORDER — ACETAMINOPHEN 500 MG
1000 TABLET ORAL ONCE
Status: COMPLETED | OUTPATIENT
Start: 2024-02-06 | End: 2024-02-06

## 2024-02-06 RX ORDER — ONDANSETRON 4 MG/1
8 TABLET, ORALLY DISINTEGRATING ORAL ONCE
Status: COMPLETED | OUTPATIENT
Start: 2024-02-06 | End: 2024-02-06

## 2024-02-06 RX ADMIN — ACETAMINOPHEN 1000 MG: 500 TABLET ORAL at 22:10

## 2024-02-06 RX ADMIN — ONDANSETRON 8 MG: 4 TABLET, ORALLY DISINTEGRATING ORAL at 22:10

## 2024-02-06 NOTE — Clinical Note
[facility] EMERGENCY DEPARTMENT  [address] [place] [address]  Phone: [phone]    Chelsea Griffin was seen and treated in our emergency department on 2/6/2024.  He may return to work on 02/08/2024.         Thank you for choosing [facility].    Zaid Weaver MD

## 2024-02-07 VITALS
OXYGEN SATURATION: 97 % | HEART RATE: 79 BPM | RESPIRATION RATE: 18 BRPM | SYSTOLIC BLOOD PRESSURE: 141 MMHG | WEIGHT: 293 LBS | TEMPERATURE: 98 F | DIASTOLIC BLOOD PRESSURE: 86 MMHG | BODY MASS INDEX: 43.4 KG/M2 | HEIGHT: 69 IN

## 2024-02-07 NOTE — ED PROVIDER NOTES
Subjective   History of Present Illness  Complaint fever chills cough congestion sore throat    History of present illness 23-year-old female 2-day history of cough congestion sore throat nausea some vomiting.  No diarrhea.  Subjective fever and chills.  No foreign travels no antibiotic use no recent illness flus viruses other than this.  She denies any urinary complaints.  No foreign travels no one home with similar illness no leg pain or swelling.  No rashes.  No dysuria or frequency.      Review of Systems   Constitutional:  Positive for chills and fever.   HENT:  Positive for congestion and sore throat.    Respiratory:  Positive for cough. Negative for chest tightness and shortness of breath.    Cardiovascular:  Negative for chest pain.   Gastrointestinal:  Positive for vomiting. Negative for abdominal pain.   Genitourinary:  Negative for difficulty urinating and dysuria.   Musculoskeletal:  Positive for myalgias.   Neurological:  Positive for headaches.       Past Medical History:   Diagnosis Date    Anxiety     Heart murmur     PTSD (post-traumatic stress disorder)        Allergies   Allergen Reactions    Promethazine Unknown - High Severity       Past Surgical History:   Procedure Laterality Date    ADENOIDECTOMY      TONSILLECTOMY         Family History   Problem Relation Age of Onset    Anxiety disorder Mother     Arthritis Mother     Asthma Mother     Cancer Mother     Depression Mother     Developmental Disability Mother     Diabetes Mother     Hearing loss Mother     Hyperlipidemia Mother     Hypertension Mother     Mental illness Mother     Stroke Mother     COPD Father     Hyperlipidemia Father     Hypertension Father     Depression Father     Diabetes Maternal Grandmother     Cancer Maternal Grandmother     Hypertension Maternal Grandmother     Hyperlipidemia Maternal Grandmother     COPD Maternal Grandfather     Asthma Maternal Grandfather     Cancer Maternal Grandfather     Diabetes Paternal  Grandmother     Cancer Paternal Grandmother     Hypertension Paternal Grandmother     Hyperlipidemia Paternal Grandmother     COPD Paternal Grandfather     Heart disease Paternal Grandfather        Social History     Socioeconomic History    Marital status: Single   Tobacco Use    Smoking status: Never    Smokeless tobacco: Never   Vaping Use    Vaping Use: Never used   Substance and Sexual Activity    Drug use: Never    Sexual activity: Defer     Prior to Admission medications    Medication Sig Start Date End Date Taking? Authorizing Provider   albuterol sulfate  (90 Base) MCG/ACT inhaler Inhale 2 puffs Every 4 (Four) Hours As Needed for Wheezing. 10/1/23   Cici Villarreal APRN   levocetirizine (XYZAL) 5 MG tablet Take 1 tablet by mouth Every Evening for 30 days. 9/25/23 10/25/23  Arnold Guevara PA   methylPREDNISolone (MEDROL) 4 MG dose pack Take as directed on package instructions. 9/25/23   Arnold Guevara PA   metoclopramide (REGLAN) 10 MG tablet Take 1 tablet by mouth 3 (Three) Times a Day As Needed (Nausea vomiting). 5/15/23   Zaid Weaver MD   SUMAtriptan (Imitrex) 25 MG tablet IMITREX 25 MG TABS 9/28/16   ProviderKaylee MD   topiramate (Topamax) 25 MG tablet TOPAMAX 25 MG TABS 9/28/16   Provider, MD Kaylee          Objective   Physical Exam  Constitutional 23-year-old female awake alert no distress triage vital signs reviewed.  HEENT extraocular muscles are intact pupils equal round react there is no photophobia TMs are clear mouth is clear no exudate abscess stridor drooling.  No trismus tongue normal floor the mouth normal no angioedema speech is normal no stridor no drooling.  Neck supple no adenopathy no meningeal signs lungs clear no retractions heart regular without murmur abdomen soft nontender good bowel sounds no peritoneal findings or other masses no enlarged liver or spleen extremities no edema no cords or Homans' sign no evidence of DVT skin warm and dry without  rashes neurologic awake alert follows commands motor strength normal without focal weakness.  Procedures           ED Course     Results for orders placed or performed during the hospital encounter of 02/06/24   Respiratory Panel PCR w/COVID-19(SARS-CoV-2) DINA/MAGALI/CLOVIS/PAD/COR/ATUL In-House, NP Swab in UTM/VTM, 2 HR TAT - Swab, Nasopharynx    Specimen: Nasopharynx; Swab   Result Value Ref Range    ADENOVIRUS, PCR Not Detected Not Detected    Coronavirus 229E Not Detected Not Detected    Coronavirus HKU1 Not Detected Not Detected    Coronavirus NL63 Not Detected Not Detected    Coronavirus OC43 Not Detected Not Detected    COVID19 Not Detected Not Detected - Ref. Range    Human Metapneumovirus Not Detected Not Detected    Human Rhinovirus/Enterovirus Detected (A) Not Detected    Influenza A PCR Not Detected Not Detected    Influenza B PCR Not Detected Not Detected    Parainfluenza Virus 1 Not Detected Not Detected    Parainfluenza Virus 2 Not Detected Not Detected    Parainfluenza Virus 3 Not Detected Not Detected    Parainfluenza Virus 4 Not Detected Not Detected    RSV, PCR Not Detected Not Detected    Bordetella pertussis pcr Not Detected Not Detected    Bordetella parapertussis PCR Not Detected Not Detected    Chlamydophila pneumoniae PCR Not Detected Not Detected    Mycoplasma pneumo by PCR Not Detected Not Detected   Rapid Strep A Screen - Swab, Throat    Specimen: Throat; Swab   Result Value Ref Range    Strep A Ag Negative Negative     XR Chest 2 View    Result Date: 2/6/2024  Impression: No acute cardiopulmonary disease. Electronically Signed: Arnold Esquivel MD  2/6/2024 10:26 PM EST  Workstation ID: [id number]   Medications   ondansetron ODT (ZOFRAN-ODT) disintegrating tablet 8 mg (8 mg Oral Given 2/6/24 2210)   acetaminophen (TYLENOL) tablet 1,000 mg (1,000 mg Oral Given 2/6/24 2210)                                              Medical Decision Making  Decision making.  Patient given Zofran for nausea gram of  Tylenol as well respiratory panel showed human rhinovirus enterovirus screen negative.  Chest x-ray obtained my independent review no pneumonia pneumothorax acute findings radiology unremarkable.  Patient repeat exam resting comfortably talked about the findings she is in no distress I do not see evidence that suggest peritonsillar abscess or retropharyngeal abscess or angioedema or Nora's angina.  She has no pain to the anterior trachea or the hyoid bone no redness no rhinitis tongue and floor mouth normal.  I will see him as her compromise or pneumonia or sepsis or bacteremia.  Although my complete list of all possibilities should be discharged home for supportive care stable unremarkable ER course.    Problems Addressed:  Rhinovirus infection: complicated acute illness or injury    Amount and/or Complexity of Data Reviewed  Radiology: ordered and independent interpretation performed. Decision-making details documented in ED Course.    Risk  OTC drugs.  Prescription drug management.        Final diagnoses:   Rhinovirus infection       ED Disposition  ED Disposition       ED Disposition   Discharge    Condition   Stable    Comment   --               Kassandra Arreaga PA  [address]  [place] [facility]  [phone]    In 1 day  Call tomorrow         Medication List      No changes were made to your prescriptions during this visit.            Zaid Weaver MD  02/07/24 0134

## 2024-02-07 NOTE — DISCHARGE INSTRUCTIONS
Rest plenty of fluids Tylenol ibuprofen for body aches fevers and chills  Return for shortness of breath coughing up blood or uncontrolled fevers cannot hold anything down rash altered mental status or any other new or worsening problems or concerns return admitted to the ER